# Patient Record
Sex: FEMALE | Race: WHITE | Employment: FULL TIME
[De-identification: names, ages, dates, MRNs, and addresses within clinical notes are randomized per-mention and may not be internally consistent; named-entity substitution may affect disease eponyms.]

---

## 2017-01-09 ENCOUNTER — OFFICE VISIT (OUTPATIENT)
Dept: UROLOGY | Facility: CLINIC | Age: 37
End: 2017-01-09

## 2017-01-09 VITALS
BODY MASS INDEX: 23.46 KG/M2 | HEIGHT: 66 IN | TEMPERATURE: 98.8 F | WEIGHT: 146 LBS | SYSTOLIC BLOOD PRESSURE: 120 MMHG | DIASTOLIC BLOOD PRESSURE: 76 MMHG

## 2017-01-09 DIAGNOSIS — R10.9 FLANK PAIN: ICD-10-CM

## 2017-01-09 DIAGNOSIS — N20.0 KIDNEY STONES: ICD-10-CM

## 2017-01-09 DIAGNOSIS — R10.9 LEFT SIDED ABDOMINAL PAIN: Primary | ICD-10-CM

## 2017-01-09 PROCEDURE — 99213 OFFICE O/P EST LOW 20 MIN: CPT | Performed by: PHYSICIAN ASSISTANT

## 2017-01-30 DIAGNOSIS — N93.8 DUB (DYSFUNCTIONAL UTERINE BLEEDING): ICD-10-CM

## 2017-01-30 RX ORDER — NORETHINDRONE ACETATE AND ETHINYL ESTRADIOL AND FERROUS FUMARATE 1MG-20(24)
1 KIT ORAL DAILY
Qty: 28 TABLET | Refills: 3 | Status: SHIPPED | OUTPATIENT
Start: 2017-01-30 | End: 2019-07-10 | Stop reason: SDUPTHER

## 2017-07-24 ENCOUNTER — HOSPITAL ENCOUNTER (OUTPATIENT)
Dept: SLEEP CENTER | Age: 37
Discharge: HOME | End: 2017-07-24
Payer: COMMERCIAL

## 2017-07-24 PROCEDURE — 95806 SLEEP STUDY UNATT&RESP EFFT: CPT

## 2017-10-10 ENCOUNTER — OFFICE VISIT (OUTPATIENT)
Dept: OBGYN | Age: 37
End: 2017-10-10
Payer: COMMERCIAL

## 2017-10-10 VITALS
SYSTOLIC BLOOD PRESSURE: 122 MMHG | HEIGHT: 65 IN | BODY MASS INDEX: 24.16 KG/M2 | WEIGHT: 145 LBS | DIASTOLIC BLOOD PRESSURE: 80 MMHG

## 2017-10-10 DIAGNOSIS — N93.8 DUB (DYSFUNCTIONAL UTERINE BLEEDING): Primary | ICD-10-CM

## 2017-10-10 DIAGNOSIS — R10.2 PELVIC PAIN IN FEMALE: ICD-10-CM

## 2017-10-10 PROCEDURE — 1036F TOBACCO NON-USER: CPT | Performed by: ADVANCED PRACTICE MIDWIFE

## 2017-10-10 PROCEDURE — G8420 CALC BMI NORM PARAMETERS: HCPCS | Performed by: ADVANCED PRACTICE MIDWIFE

## 2017-10-10 PROCEDURE — G8484 FLU IMMUNIZE NO ADMIN: HCPCS | Performed by: ADVANCED PRACTICE MIDWIFE

## 2017-10-10 PROCEDURE — G8427 DOCREV CUR MEDS BY ELIG CLIN: HCPCS | Performed by: ADVANCED PRACTICE MIDWIFE

## 2017-10-10 PROCEDURE — 99213 OFFICE O/P EST LOW 20 MIN: CPT | Performed by: ADVANCED PRACTICE MIDWIFE

## 2017-10-10 NOTE — PROGRESS NOTES
PROBLEM VISIT     Date of service: 10/10/2017    Christine Spivey  Is a 40 y.o. single female    PT's PCP is: Billy Atwood MD     : 1980                                             Subjective:       Patient's last menstrual period was 2017 (exact date). OB History    Para Term  AB Living   5 4 3 1 1 4   SAB TAB Ectopic Molar Multiple Live Births   1         4      # Outcome Date GA Lbr Emlchor/2nd Weight Sex Delivery Anes PTL Lv   5 Term 2009 38w0d 10:00 8 lb 3 oz (3.714 kg) M Vag-Spont  N RIO   4  2007 35w0d 02:00 6 lb 7 oz (2.92 kg)  Vag-Spont  Y RIO      Birth Comments:  labor at 31wks   3 Term 2002 37w0d 05:00 6 lb 7 oz (2.92 kg)  Vag-Spont  N RIO      Birth Comments: GDM   2 SAB 2001 13w0d          1 Term 1999 41w0d 07:00 7 lb 6 oz (3.345 kg)  Vag-Spont  N RIO      Birth Comments: pre-e           History   Smoking Status    Former Smoker    Packs/day: 0.25    Types: Cigarettes    Quit date: 2016   Smokeless Tobacco    Never Used        History   Alcohol Use    0.0 oz/week     Comment: rarely       History   Sexual Activity    Sexual activity: Yes    Partners: Male       Allergies: Morphine; Pcn [penicillins]; Codeine; and Nickel    Chief Complaint   Patient presents with    Irregular Menses     Pt having irregular bleeding- was put on ocp and it regulates for a few months and then its back to irregular- pt had medication adjusted to different hormones and then started having everyday morning sickness; Pt started her cycle on  and is still currently spotting/bleeding; pt does not want to go back on OCP becuase of feeling sick but wants to do something; Last Yearly:  16    Last pap: 16    Last HPV: 16    PE:  Vital Signs  Blood pressure 122/80, height 5' 5\" (1.651 m), weight 145 lb (65.8 kg), last menstrual period 2017, not currently breastfeeding.      Labs:    No results found for this visit on

## 2017-10-11 ASSESSMENT — ENCOUNTER SYMPTOMS
ABDOMINAL PAIN: 1
EYES NEGATIVE: 1
RESPIRATORY NEGATIVE: 1

## 2017-12-27 ENCOUNTER — TELEPHONE (OUTPATIENT)
Dept: OBGYN | Age: 37
End: 2017-12-27

## 2017-12-27 DIAGNOSIS — N93.8 DUB (DYSFUNCTIONAL UTERINE BLEEDING): Primary | ICD-10-CM

## 2017-12-28 ENCOUNTER — HOSPITAL ENCOUNTER (OUTPATIENT)
Age: 37
Discharge: HOME | End: 2017-12-28
Payer: COMMERCIAL

## 2017-12-28 DIAGNOSIS — N93.8 DUB (DYSFUNCTIONAL UTERINE BLEEDING): ICD-10-CM

## 2017-12-28 LAB
ESTRADIOL LEVEL: 36 PG/ML (ref 27–314)
LH: 22.4 U/L (ref 1–95.6)
PROGESTERONE LEVEL: <0.05 NG/ML
PROLACTIN: 13.06 UG/L (ref 4.79–23.3)
TSH SERPL DL<=0.05 MIU/L-ACNC: 0.97 MIU/L (ref 0.3–5)

## 2017-12-28 PROCEDURE — 82670 ASSAY OF TOTAL ESTRADIOL: CPT

## 2017-12-28 PROCEDURE — 84144 ASSAY OF PROGESTERONE: CPT

## 2017-12-28 PROCEDURE — 84146 ASSAY OF PROLACTIN: CPT

## 2017-12-28 PROCEDURE — 83001 ASSAY OF GONADOTROPIN (FSH): CPT

## 2017-12-28 PROCEDURE — 84443 ASSAY THYROID STIM HORMONE: CPT

## 2017-12-28 PROCEDURE — 36415 COLL VENOUS BLD VENIPUNCTURE: CPT

## 2017-12-28 PROCEDURE — 83002 ASSAY OF GONADOTROPIN (LH): CPT

## 2017-12-29 LAB — FOLLICLE STIMULATING HORMONE: 7.3 U/L (ref 1.7–21.5)

## 2018-01-10 ENCOUNTER — TELEPHONE (OUTPATIENT)
Dept: OBGYN | Age: 38
End: 2018-01-10

## 2018-01-10 DIAGNOSIS — N93.8 DUB (DYSFUNCTIONAL UTERINE BLEEDING): Primary | ICD-10-CM

## 2018-01-23 LAB
C-REACTIVE PROTEIN: 4.07 MG/L
INSULIN: 7.1 MIU/ML (ref 0–29.1)

## 2018-01-24 LAB
GLUCOSE 1 HOUR: 95 MG/DL
GLUCOSE 2 HOUR: 100 MG/DL
GLUCOSE 3: 70 MG/DL
GLUCOSE FASTING: 95 MG/DL (ref 70–110)

## 2018-09-17 ENCOUNTER — OFFICE VISIT (OUTPATIENT)
Dept: OBGYN | Age: 38
End: 2018-09-17
Payer: COMMERCIAL

## 2018-09-17 VITALS
HEIGHT: 65 IN | DIASTOLIC BLOOD PRESSURE: 66 MMHG | WEIGHT: 154.2 LBS | BODY MASS INDEX: 25.69 KG/M2 | SYSTOLIC BLOOD PRESSURE: 116 MMHG

## 2018-09-17 DIAGNOSIS — N93.8 DUB (DYSFUNCTIONAL UTERINE BLEEDING): Primary | ICD-10-CM

## 2018-09-17 DIAGNOSIS — Z09 FOLLOW-UP EXAMINATION AFTER GYNECOLOGICAL SURGERY: ICD-10-CM

## 2018-09-17 PROCEDURE — 1036F TOBACCO NON-USER: CPT | Performed by: ADVANCED PRACTICE MIDWIFE

## 2018-09-17 PROCEDURE — G8419 CALC BMI OUT NRM PARAM NOF/U: HCPCS | Performed by: ADVANCED PRACTICE MIDWIFE

## 2018-09-17 PROCEDURE — 99213 OFFICE O/P EST LOW 20 MIN: CPT | Performed by: ADVANCED PRACTICE MIDWIFE

## 2018-09-17 PROCEDURE — G8427 DOCREV CUR MEDS BY ELIG CLIN: HCPCS | Performed by: ADVANCED PRACTICE MIDWIFE

## 2018-09-17 ASSESSMENT — PATIENT HEALTH QUESTIONNAIRE - PHQ9
SUM OF ALL RESPONSES TO PHQ QUESTIONS 1-9: 0
1. LITTLE INTEREST OR PLEASURE IN DOING THINGS: 0
SUM OF ALL RESPONSES TO PHQ9 QUESTIONS 1 & 2: 0
2. FEELING DOWN, DEPRESSED OR HOPELESS: 0
SUM OF ALL RESPONSES TO PHQ QUESTIONS 1-9: 0

## 2018-09-17 NOTE — PROGRESS NOTES
her tubes are patent    Yes  PT denies fever, chills, nausea and vomiting                                 Assessment and Plan          Diagnosis Orders   1. DUB (dysfunctional uterine bleeding)  Follicle Stimulating Hormone    Luteinizing Hormone    Prolactin    TSH without Reflex    XR HYSTEROSALPINGOGRAPHY S&I    Estradiol    Progesterone   2. Follow-up examination after gynecological surgery  XR HYSTEROSALPINGOGRAPHY S&I             I am having Ms. Sands maintain her ALPRAZolam, norgestimate-ethinyl estradiol, meloxicam, ondansetron, and Norethin Ace-Eth Estrad-FE. Return for pt needs [facility] at [facility].    She was also counseled on her preventative health maintenance recommendations and follow-up. There are no Patient Instructions on file for this visit.     Arnoldo Greene,9/17/2018 7:27 PM

## 2018-09-25 ENCOUNTER — HOSPITAL ENCOUNTER (OUTPATIENT)
Dept: GENERAL RADIOLOGY | Age: 38
Discharge: HOME | End: 2018-09-27
Payer: COMMERCIAL

## 2018-09-25 DIAGNOSIS — Z09 FOLLOW-UP EXAMINATION AFTER GYNECOLOGICAL SURGERY: ICD-10-CM

## 2018-09-25 DIAGNOSIS — N93.8 DUB (DYSFUNCTIONAL UTERINE BLEEDING): ICD-10-CM

## 2018-09-25 PROCEDURE — 74740 X-RAY FEMALE GENITAL TRACT: CPT

## 2018-09-25 PROCEDURE — 6360000004 HC RX CONTRAST MEDICATION: Performed by: ADVANCED PRACTICE MIDWIFE

## 2018-09-25 RX ADMIN — IOHEXOL 20 ML: 240 INJECTION, SOLUTION INTRATHECAL; INTRAVASCULAR; INTRAVENOUS; ORAL at 09:55

## 2019-07-10 ENCOUNTER — OFFICE VISIT (OUTPATIENT)
Dept: OBGYN | Age: 39
End: 2019-07-10
Payer: COMMERCIAL

## 2019-07-10 ENCOUNTER — HOSPITAL ENCOUNTER (OUTPATIENT)
Age: 39
Setting detail: SPECIMEN
Discharge: HOME | End: 2019-07-10
Payer: COMMERCIAL

## 2019-07-10 VITALS
SYSTOLIC BLOOD PRESSURE: 118 MMHG | HEIGHT: 65 IN | WEIGHT: 141 LBS | DIASTOLIC BLOOD PRESSURE: 78 MMHG | BODY MASS INDEX: 23.49 KG/M2

## 2019-07-10 DIAGNOSIS — N93.8 DUB (DYSFUNCTIONAL UTERINE BLEEDING): ICD-10-CM

## 2019-07-10 DIAGNOSIS — N76.0 ACUTE VAGINITIS: ICD-10-CM

## 2019-07-10 DIAGNOSIS — N76.0 ACUTE VAGINITIS: Primary | ICD-10-CM

## 2019-07-10 LAB
DIRECT EXAM: ABNORMAL
Lab: ABNORMAL
SPECIMEN DESCRIPTION: ABNORMAL

## 2019-07-10 PROCEDURE — 1036F TOBACCO NON-USER: CPT | Performed by: ADVANCED PRACTICE MIDWIFE

## 2019-07-10 PROCEDURE — 87660 TRICHOMONAS VAGIN DIR PROBE: CPT

## 2019-07-10 PROCEDURE — 99213 OFFICE O/P EST LOW 20 MIN: CPT | Performed by: ADVANCED PRACTICE MIDWIFE

## 2019-07-10 PROCEDURE — 87510 GARDNER VAG DNA DIR PROBE: CPT

## 2019-07-10 PROCEDURE — 87480 CANDIDA DNA DIR PROBE: CPT

## 2019-07-10 PROCEDURE — G8420 CALC BMI NORM PARAMETERS: HCPCS | Performed by: ADVANCED PRACTICE MIDWIFE

## 2019-07-10 PROCEDURE — G8427 DOCREV CUR MEDS BY ELIG CLIN: HCPCS | Performed by: ADVANCED PRACTICE MIDWIFE

## 2019-07-10 RX ORDER — METRONIDAZOLE 500 MG/1
500 TABLET ORAL 2 TIMES DAILY
Qty: 14 TABLET | Refills: 0 | Status: SHIPPED | OUTPATIENT
Start: 2019-07-10 | End: 2019-07-17

## 2019-07-10 RX ORDER — NORETHINDRONE ACETATE AND ETHINYL ESTRADIOL AND FERROUS FUMARATE 1MG-20(24)
1 KIT ORAL DAILY
Qty: 28 TABLET | Refills: 11 | Status: SHIPPED | OUTPATIENT
Start: 2019-07-10 | End: 2021-09-27

## 2019-07-10 ASSESSMENT — PATIENT HEALTH QUESTIONNAIRE - PHQ9: DEPRESSION UNABLE TO ASSESS: PT REFUSES

## 2019-07-10 NOTE — PROGRESS NOTES
letty     PROBLEM VISIT     Date of service: 7/10/2019    Pascual Silvestre  Is a 46 y.o.  female    PT's PCP is: Chris Jones MD     : 1980                                             Subjective:       Patient's last menstrual period was 2019 (exact date). OB History    Para Term  AB Living   5 4 3 1 1 4   SAB TAB Ectopic Molar Multiple Live Births   1         4      # Outcome Date GA Lbr Melchor/2nd Weight Sex Delivery Anes PTL Lv   5 Term 2009 38w0d 10:00 8 lb 3 oz (3.714 kg) M Vag-Spont  N RIO   4  2007 35w0d 02:00 6 lb 7 oz (2.92 kg)  Vag-Spont  Y RIO      Birth Comments:  labor at 28wks   3 Term 2002 37w0d 05:00 6 lb 7 oz (2.92 kg)  Vag-Spont  N RIO      Birth Comments: GDM   2 SAB 2001 13w0d          1 Term 1999 41w0d 07:00 7 lb 6 oz (3.345 kg)  Vag-Spont  N RIO      Birth Comments: pre-e        Social History     Tobacco Use   Smoking Status Former Smoker    Packs/day: 0.25    Types: Cigarettes    Last attempt to quit: 2016    Years since quittin.6   Smokeless Tobacco Never Used        Social History     Substance and Sexual Activity   Alcohol Use Yes    Alcohol/week: 0.0 oz    Comment: rarely       Allergies: Morphine; Pcn [penicillins];  Codeine; and Nickel      Current Outpatient Medications:     Norethin Ace-Eth Estrad-FE 1-20 MG-MCG(24) TABS, Take 1 tablet by mouth daily, Disp: 28 tablet, Rfl: 3    meloxicam (MOBIC) 15 MG tablet, Take 1 tablet by mouth daily, Disp: 30 tablet, Rfl: 0    ondansetron (ZOFRAN) 4 MG tablet, Take 1 tablet by mouth every 8 hours as needed for Nausea or Vomiting, Disp: 30 tablet, Rfl: 0    norgestimate-ethinyl estradiol (ORTHO-CYCLEN, 28,) 0.25-35 MG-MCG per tablet, Take 1 tablet by mouth daily, Disp: 1 packet, Rfl: 12    ALPRAZolam (XANAX) 1 MG tablet, nightly as needed , Disp: , Rfl: 1    Social History     Substance and Sexual Activity   Sexual Activity Yes    Partners: Male    Comment: none no bleeding

## 2019-08-12 ENCOUNTER — OFFICE VISIT (OUTPATIENT)
Dept: OBGYN | Age: 39
End: 2019-08-12
Payer: COMMERCIAL

## 2019-08-12 ENCOUNTER — HOSPITAL ENCOUNTER (OUTPATIENT)
Age: 39
Setting detail: SPECIMEN
Discharge: HOME | End: 2019-08-12
Payer: COMMERCIAL

## 2019-08-12 VITALS
WEIGHT: 142 LBS | HEIGHT: 66 IN | BODY MASS INDEX: 22.82 KG/M2 | DIASTOLIC BLOOD PRESSURE: 62 MMHG | SYSTOLIC BLOOD PRESSURE: 110 MMHG

## 2019-08-12 DIAGNOSIS — Z11.51 SCREENING FOR HPV (HUMAN PAPILLOMAVIRUS): ICD-10-CM

## 2019-08-12 DIAGNOSIS — Z01.419 WELL WOMAN EXAM WITH ROUTINE GYNECOLOGICAL EXAM: ICD-10-CM

## 2019-08-12 DIAGNOSIS — Z01.419 WELL WOMAN EXAM WITH ROUTINE GYNECOLOGICAL EXAM: Primary | ICD-10-CM

## 2019-08-12 PROCEDURE — 87624 HPV HI-RISK TYP POOLED RSLT: CPT

## 2019-08-12 PROCEDURE — 99395 PREV VISIT EST AGE 18-39: CPT | Performed by: ADVANCED PRACTICE MIDWIFE

## 2019-08-12 PROCEDURE — G0145 SCR C/V CYTO,THINLAYER,RESCR: HCPCS

## 2019-08-12 ASSESSMENT — PATIENT HEALTH QUESTIONNAIRE - PHQ9: DEPRESSION UNABLE TO ASSESS: PT REFUSES

## 2019-08-14 LAB
HPV SAMPLE: NORMAL
HPV, GENOTYPE 16: NOT DETECTED
HPV, GENOTYPE 18: NOT DETECTED
HPV, HIGH RISK OTHER: NOT DETECTED
HPV, INTERPRETATION: NORMAL
SPECIMEN DESCRIPTION: NORMAL

## 2019-08-16 LAB — CYTOLOGY REPORT: NORMAL

## 2021-02-17 ENCOUNTER — OFFICE VISIT (OUTPATIENT)
Dept: OBGYN | Age: 41
End: 2021-02-17
Payer: COMMERCIAL

## 2021-02-17 ENCOUNTER — HOSPITAL ENCOUNTER (OUTPATIENT)
Age: 41
Setting detail: SPECIMEN
Discharge: HOME | End: 2021-02-17
Payer: COMMERCIAL

## 2021-02-17 VITALS
BODY MASS INDEX: 24.66 KG/M2 | SYSTOLIC BLOOD PRESSURE: 104 MMHG | WEIGHT: 148 LBS | HEIGHT: 65 IN | DIASTOLIC BLOOD PRESSURE: 60 MMHG

## 2021-02-17 DIAGNOSIS — Z01.419 WELL WOMAN EXAM WITH ROUTINE GYNECOLOGICAL EXAM: ICD-10-CM

## 2021-02-17 DIAGNOSIS — Z01.419 WELL WOMAN EXAM WITH ROUTINE GYNECOLOGICAL EXAM: Primary | ICD-10-CM

## 2021-02-17 DIAGNOSIS — Z12.31 ENCOUNTER FOR SCREENING MAMMOGRAM FOR MALIGNANT NEOPLASM OF BREAST: ICD-10-CM

## 2021-02-17 PROCEDURE — G0145 SCR C/V CYTO,THINLAYER,RESCR: HCPCS

## 2021-02-17 PROCEDURE — 99395 PREV VISIT EST AGE 18-39: CPT | Performed by: ADVANCED PRACTICE MIDWIFE

## 2021-02-17 ASSESSMENT — PATIENT HEALTH QUESTIONNAIRE - PHQ9
SUM OF ALL RESPONSES TO PHQ QUESTIONS 1-9: 0
1. LITTLE INTEREST OR PLEASURE IN DOING THINGS: 0
SUM OF ALL RESPONSES TO PHQ QUESTIONS 1-9: 0
2. FEELING DOWN, DEPRESSED OR HOPELESS: 0
SUM OF ALL RESPONSES TO PHQ9 QUESTIONS 1 & 2: 0

## 2021-02-17 NOTE — PROGRESS NOTES
(Patient not taking: Reported on 8/12/2019), Disp: 28 tablet, Rfl: 11    meloxicam (MOBIC) 15 MG tablet, Take 1 tablet by mouth daily (Patient not taking: Reported on 8/12/2019), Disp: 30 tablet, Rfl: 0    ondansetron (ZOFRAN) 4 MG tablet, Take 1 tablet by mouth every 8 hours as needed for Nausea or Vomiting (Patient not taking: Reported on 8/12/2019), Disp: 30 tablet, Rfl: 0    norgestimate-ethinyl estradiol (ORTHO-CYCLEN, 28,) 0.25-35 MG-MCG per tablet, Take 1 tablet by mouth daily (Patient not taking: Reported on 8/12/2019), Disp: 1 packet, Rfl: 12    ALPRAZolam (XANAX) 1 MG tablet, nightly as needed , Disp: , Rfl: 1    Social History     Substance and Sexual Activity   Sexual Activity Yes    Partners: Male    Comment: none        Any bleeding or pain with intercourse: No    Last Yearly:  2019    Last pap: 08/12/19    Last HPV: 08/12/19 neg     Last Mammogram: n/a    Last Dexascan n/a    Last colorectal screen- type:n/a*  date      Do you do self breast exams: Yes    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    High risk HPV infection     Kidney stone     LSIL (low grade squamous intraepithelial lesion) on Pap smear 05/09/2012    LSIL (low grade squamous intraepithelial lesion) on Pap smear 03/21/2007    PTSD (post-traumatic stress disorder)     Type O blood, Rh negative        Past Surgical History:   Procedure Laterality Date    APPENDECTOMY  2012    BARTHOLIN GLAND CYST EXCISION      [place]  2012    [place]    COLPOSCOPY  06/01/2012    d/t LSIL    COLPOSCOPY  08/30/2007    WNL    ENDOMETRIAL BIOPSY  3/15/13    WNL    LAPAROSCOPY  06/15/2012    dx lap WNL    LEEP      LIPOSUCTION      OTHER SURGICAL HISTORY  04/24/2009    ESSURE    TONSILLECTOMY AND ADENOIDECTOMY         Family History   Problem Relation Age of Onset    Hypertension Father     Other Father         diverticulitis    Other Brother         diverticulitis    Other Brother         ulcerative coloitis    Ovarian Cancer Maternal Grandmother     Diabetes Paternal Grandfather     Heart Disease Paternal Grandfather     Ovarian Cancer Maternal Aunt        Chief Complaint   Patient presents with    Gynecologic Exam     Yearly. last pap 08/12/19 HPV neg. pt states no issues or concerns. pt declines std testing           PE:  Vital Signs  Blood pressure 104/60, height 5' 5\" (1.651 m), weight 148 lb (67.1 kg), last menstrual period 02/07/2021, not currently breastfeeding. Estimated body mass index is 24.63 kg/m² as calculated from the following:    Height as of this encounter: 5' 5\" (1.651 m). Weight as of this encounter: 148 lb (67.1 kg). Labs:    No results found for this visit on 02/17/21. PHQ-9 Total Score: 0 (2/17/2021 11:51 AM)      NURSE: DEJON    HPI: Patient here today for routine well woman exam.  Patient denies needs or concerns at this point in time 2 weeks ago she had liposuction. Review of Systems   All other systems reviewed and are negative. Objective  Lymphatic:   no lymphadenopathy  Heent:   negative   Cor: regular rate and rhythm, no murmurs              Pul:clear to auscultation bilaterally- no wheezes, rales or rhonchi, normal air movement, no respiratory distress      GI: Exam deferred due to recent surgery and abdominal swelling with tenderness           Extremities: normal strength, tone, and muscle mass   Breasts: Breast: Bilateral breast implants noted no abnormalities   Pelvic Exam: GENITAL/URINARY:  External Genitalia:  General appearance; normal, Hair distribution; normal, Lesions absent  Urethra:   Fullness absent, Masses absent  Bladder:  Fullness absent, Masses absent, Tenderness absent, Cystocele absent  Vagina:  General appearance normal, Estrogen effect normal, Discharge absent, Lesions absent, Pelvic support normal  Cervix:  General appearance normal, Lesions absent, Tenderness absent, Enlargement absent, Nodularity absent, spotting with Pap  Uterus:  Size normal, Contour normal  Adenexa: Masses absent                                                          Assessment and Plan          Diagnosis Orders   1. Well woman exam with routine gynecological exam  PAP SMEAR   2. Encounter for screening mammogram for malignant neoplasm of breast  JEANINE MATTHEW DIGITAL SCREEN BILATERAL             I am having Dairamsey Bond maintain her ALPRAZolam, norgestimate-ethinyl estradiol, meloxicam, ondansetron, and Norethin Ace-Eth Estrad-FE. Return in about 1 year (around 2/17/2022) for yearly. She was also counseled on her preventative health maintenance recommendations and follow-up. There are no Patient Instructions on file for this visit.     Dk Greene,2/17/2021 12:29 PM

## 2021-03-01 LAB — CYTOLOGY REPORT: NORMAL

## 2021-09-27 ENCOUNTER — OFFICE VISIT (OUTPATIENT)
Dept: OBGYN | Age: 41
End: 2021-09-27
Payer: COMMERCIAL

## 2021-09-27 VITALS
SYSTOLIC BLOOD PRESSURE: 116 MMHG | HEIGHT: 65 IN | WEIGHT: 148 LBS | BODY MASS INDEX: 24.66 KG/M2 | DIASTOLIC BLOOD PRESSURE: 72 MMHG

## 2021-09-27 DIAGNOSIS — N92.0 MENORRHAGIA WITH REGULAR CYCLE: Primary | ICD-10-CM

## 2021-09-27 DIAGNOSIS — N90.89 VULVAR IRRITATION: ICD-10-CM

## 2021-09-27 PROCEDURE — 99213 OFFICE O/P EST LOW 20 MIN: CPT | Performed by: OBSTETRICS & GYNECOLOGY

## 2021-09-27 RX ORDER — TRANEXAMIC ACID 650 1/1
TABLET ORAL
Qty: 30 TABLET | Refills: 5 | Status: SHIPPED | OUTPATIENT
Start: 2021-09-27 | End: 2022-02-16

## 2021-09-27 RX ORDER — LIDOCAINE 50 MG/G
OINTMENT TOPICAL
Qty: 1 EACH | Refills: 1 | Status: SHIPPED | OUTPATIENT
Start: 2021-09-27 | End: 2022-02-16

## 2021-09-27 NOTE — PROGRESS NOTES
PROBLEM VISIT     Date of service: 2021    Fatimah Martinez  Is a 37 y.o. single female    PT's PCP is: Aravind Herron MD     : 1980                                             Subjective:       No LMP recorded. OB History    Para Term  AB Living   5 4 3 1 1 4   SAB TAB Ectopic Molar Multiple Live Births   1         4      # Outcome Date GA Lbr Melchor/2nd Weight Sex Delivery Anes PTL Lv   5 Term 2009 38w0d 10:00 8 lb 3 oz (3.714 kg) M Vag-Spont  N RIO   4  2007 35w0d 02:00 6 lb 7 oz (2.92 kg)  Vag-Spont  Y RIO      Birth Comments:  labor at 28wks   3 Term 2002 37w0d 05:00 6 lb 7 oz (2.92 kg)  Vag-Spont  N RIO      Birth Comments: GDM   2 SAB 2001 13w0d          1 Term 1999 41w0d 07:00 7 lb 6 oz (3.345 kg)  Vag-Spont  N RIO      Birth Comments: pre-e        Social History     Tobacco Use   Smoking Status Former Smoker    Packs/day: 0.25    Types: Cigarettes    Quit date: 2016    Years since quittin.8   Smokeless Tobacco Never Used        Social History     Substance and Sexual Activity   Alcohol Use Yes    Alcohol/week: 0.0 standard drinks    Comment: rarely       Social History     Substance and Sexual Activity   Sexual Activity Yes    Partners: Male    Birth control/protection: Condom    Comment: none        Allergies: Morphine, Pcn [penicillins], Codeine, and Nickel    Chief Complaint   Patient presents with    Consultation     Pt is here today to discuss surgical options. Pt states she is having more pain, long heavy cycles.  Vaginal Pain     Pt states she had intercourse saturday with a condom and had instant pain, burning and swelling. Pt states she believes it is just d/t a reaction of the condom. Last Yearly:  21    Last pap: 21    Last HPV: 19      NURSE: Orlando Barry    PE:  Vital Signs  Blood pressure 116/72, height 5' 5\" (1.651 m), weight 148 lb (67.1 kg), not currently breastfeeding.      Labs:    No results found for this visit on 09/27/21. HPI: The patient is here and she does have significant complaints of menorrhagia stating she bleeds very heavy for 5 to 7 days with every cycle. She does not wish to use any more hormonal therapy for this complaint and is investigating other treatments. Her other complaint is that she developed immediate vulvar and vaginal irritation and burning when having sexual relations with a condom. She did request lidocaine ointment as treatment for this and is using Benadryl at home. No PT denies fever, chills, nausea and vomiting       Objective   GI: Abdomen soft, non-tender. BS normal. No masses,  No organomegaly           Pelvic Exam: GENITAL/URINARY:  External Genitalia:  Abnormal findings: The patient has significant erythema and edema of the vulva bilaterally                                                       Assessment and Plan: I did give patient information regarding vaginal hysterectomy and endometrial ablation possibly with bilateral salpingectomy. In the meantime I will give her a trial of Lysteda as she does not wish to use any hormonal medications. I also believe that she has developed a latex sensitivity due to her reaction with condom usage          Diagnosis Orders   1. Menorrhagia with regular cycle  tranexamic acid (LYSTEDA) 650 MG TABS tablet   2. Vulvar irritation  lidocaine (XYLOCAINE) 5 % ointment             I am having Jass Camera start on lidocaine and tranexamic acid. I am also having her maintain her ALPRAZolam, norgestimate-ethinyl estradiol, meloxicam, ondansetron, and Norethin Ace-Eth Estrad-FE. No follow-ups on file. Patient Instructions     Patient Education        Endometrial Ablation: Before Your Procedure  What is endometrial ablation? Endometrial ablation is a type of procedure that's often used to treat heavy menstrual bleeding. It can also be used for other types of bleeding in the uterus.  It's not recommended if you plan to get pregnant. Ablation works by destroying the lining of your uterus. As it heals, the lining will scar. This scarring reduces or prevents bleeding. Your doctor may give you medicine to help you relax. You may also get medicine to help with pain. First, your doctor inserts a special tool into your vagina. This is called a speculum. It gently spreads apart the sides of your vagina. Next, the doctor may put a lighted tube through your cervix. This is called a hysteroscope or scope. It helps the doctor see inside your uterus. Then the doctor inserts a device to destroy the lining. This device may work in one of many ways. It may use a laser beam, heat, electricity, freezing, or microwaves. Ablation can be done in a doctor's office. Or it may be done in a hospital. It usually takes less than an hour. You can go home after the procedure. Follow-up care is a key part of your treatment and safety. Be sure to make and go to all appointments, and call your doctor if you are having problems. It's also a good idea to know your test results and keep a list of the medicines you take. How do you prepare for the procedure? Procedures can be stressful. This information will help you understand what you can expect. And it will help you safely prepare for your procedure. Preparing for the procedure    · Be sure you have someone to take you home. Anesthesia and pain medicine will make it unsafe for you to drive or get home on your own.     · Understand exactly what procedure is planned, along with the risks, benefits, and other options.     · If you take aspirin or some other blood thinner, ask your doctor if you should stop taking it before your procedure. Make sure that you understand exactly what your doctor wants you to do. These medicines increase the risk of bleeding.     · Tell your doctor ALL the medicines, vitamins, supplements, and herbal remedies you take.  Some may increase the risk of problems during your procedure. Your doctor will tell you if you should stop taking any of them before the procedure and how soon to do it.     · Make sure your doctor and the hospital have a copy of your advance directive. If you don't have one, you may want to prepare one. It lets others know your health care wishes. It's a good thing to have before any type of surgery or procedure. What happens on the day of the procedure? · Follow the instructions exactly about when to stop eating and drinking. If you don't, your procedure may be canceled. If your doctor told you to take your medicines on the day of the procedure, take them with only a sip of water.     · Take a bath or shower before you come in for your procedure. Do not apply lotions, perfumes, deodorants, or nail polish.     · Take off all jewelry and piercings. And take out contact lenses, if you wear them. At the doctor's office or hospital   · Bring a picture ID.     · You will be kept comfortable and safe by your anesthesia provider. You may get medicine that relaxes you or puts you in a light sleep.     · The procedure will take less than an hour. When should you call your doctor? · You have questions or concerns.     · You do not understand how to prepare for your procedure.     · You become ill before the procedure (such as fever, flu, or a cold).     · You need to reschedule or have changed your mind about having the procedure. Where can you learn more? Go to https://[employer]. org and sign in to your [employer] account. Enter J540 in the [facility] box to learn more about \"Endometrial Ablation: Before Your Procedure. \"     If you do not have an account, please click on the \"Sign Up Now\" link. Current as of: February 11, 2021               Content Version: 13.0  © 1951-0572 Healthwise, Incorporated. Care instructions adapted under license by [facility] ([facility]).  If you have questions about a medical condition or this instruction, always ask your healthcare professional. [address] any warranty or liability for your use of this information. Patient Education        Endometrial Ablation: What to Expect at Home  Your Recovery  Endometrial ablation is a procedure to treat very heavy menstrual bleeding or other abnormal bleeding in the uterus. During ablation, the lining of the uterus is destroyed. The lining heals by scarring. The scarring reduces or prevents bleeding. Your doctor inserted a device into your uterus to destroy the lining. You may have cramps and vaginal bleeding for several days. You may also have watery vaginal discharge mixed with blood for a few days. It may take a few days to 2 weeks to recover. This care sheet gives you a general idea about how long it will take for you to recover. But each person recovers at a different pace. Follow the steps below to feel better as quickly as possible. How can you care for yourself at home? Activity    · Rest when you feel tired. Getting enough sleep will help you recover.     · Most women are able to return to work on the day after the procedure.     · You may shower and take baths as usual.     · Ask your doctor when it is okay for you to have sex. Diet    · You can eat your normal diet. If your stomach is upset, try bland, low-fat foods like plain rice, broiled chicken, toast, and yogurt.     · You may notice that your bowel movements are not regular right after the procedure. This is common. Try to avoid constipation and straining with bowel movements. You may want to take a fiber supplement every day. If you have not had a bowel movement after a couple of days, ask your doctor about taking a mild laxative. Medicines    · Your doctor will tell you if and when you can restart your medicines.  He or she will also give you instructions about taking any new medicines.     · If you take aspirin or some other blood thinner, ask your doctor if and when to start taking it again. Make sure that you understand exactly what your doctor wants you to do.     · Take pain medicines exactly as directed. ? If the doctor gave you a prescription medicine for pain, take it as prescribed. ? If you are not taking a prescription pain medicine, ask your doctor if you can take an over-the-counter medicine.     · If you think your pain medicine is making you sick to your stomach:  ? Take your medicine after meals (unless your doctor has told you not to). ? Ask your doctor for a different pain medicine.     · If your doctor prescribed antibiotics, take them as directed. Do not stop taking them just because you feel better. You need to take the full course of antibiotics. Other instructions    · You may have some light vaginal bleeding. Wear sanitary pads if needed. Do not douche or use tampons until your doctor says it is okay.     · You may want to use a heating pad on your belly to help with pain. Use a low heat setting.     · Talk with your doctor about birth control. Endometrial ablation usually causes infertility, but pregnancy may still be possible. And the pregnancy could have severe problems. Follow-up care is a key part of your treatment and safety. Be sure to make and go to all appointments, and call your doctor if you are having problems. It's also a good idea to know your test results and keep a list of the medicines you take. When should you call for help? Call 911 anytime you think you may need emergency care. For example, call if:    · You passed out (lost consciousness).     · You have chest pain, are short of breath, or cough up blood.    Call your doctor now or seek immediate medical care if:    · You have pain that does not get better after you take pain medicine.     · You cannot pass stools or gas.     · You have vaginal discharge that has increased in amount or smells bad.     · You are sick to your stomach or cannot drink fluids.     · You have signs of or she will talk to you about the risks and benefits of hormones. You can also discuss how long you should take them. This surgery probably won't lower your interest in sex. In fact, some women enjoy sex more. This may be because they no longer have to worry about birth control or heavy bleeding. Most women go home in 1 to 2 days. You will likely feel better each day. But you may need about 4 to 6 weeks to fully recover. Follow-up care is a key part of your treatment and safety. Be sure to make and go to all appointments, and call your doctor if you are having problems. It's also a good idea to know your test results and keep a list of the medicines you take. How do you prepare for surgery? Surgery can be stressful. This information will help you understand what you can expect. And it will help you safely prepare for surgery. Preparing for surgery    · Be sure you have someone to take you home. Anesthesia and pain medicine will make it unsafe for you to drive or get home on your own.     · Understand exactly what surgery is planned, along with the risks, benefits, and other options.     · If you take aspirin or some other blood thinner, ask your doctor if you should stop taking it before your surgery. Make sure that you understand exactly what your doctor wants you to do. These medicines increase the risk of bleeding.     · Tell your doctor ALL the medicines, vitamins, supplements, and herbal remedies you take. Some may increase the risk of problems during your surgery. Your doctor will tell you if you should stop taking any of them before the surgery and how soon to do it.     · Make sure your doctor and the hospital have a copy of your advance directive. If you don't have one, you may want to prepare one. It lets others know your health care wishes. It's a good thing to have before any type of surgery or procedure. What happens on the day of surgery?    · Follow the instructions exactly about when to stop stronger each day. But you might need pain medicine for a week or two. You may get tired easily or have less energy than usual. This may last for several weeks after surgery. You will probably notice that your belly is swollen and puffy. This is common. The swelling will take several weeks to go down. It may take about 4 to 6 weeks to fully recover. It's important to avoid lifting while you are recovering so that you can heal.  This care sheet gives you a general idea about how long it will take for you to recover. But each person recovers at a different pace. Follow the steps below to get better as quickly as possible. How can you care for yourself at home? Activity    · Rest when you feel tired. Getting enough sleep will help you recover.     · Try to walk each day. Start by walking a little more than you did the day before. Bit by bit, increase the amount you walk. Walking boosts blood flow and helps prevent pneumonia and constipation.     · Avoid lifting anything that would make you strain. This may include a child, heavy grocery bags and milk containers, a heavy briefcase or backpack, cat litter or dog food bags, or a vacuum .     · Avoid strenuous activities, such as biking, jogging, weight lifting, or aerobic exercise, until your doctor says it is okay.     · You may shower. Pat the cut (incision) dry. Do not take a bath for the first 2 weeks, or until your doctor tells you it is okay.     · Ask your doctor when you can drive again.     · You will probably need to take 2 to 4 weeks off from work. It depends on the type of work you do and how you feel.     · Your doctor will tell you when you can have sex again. Diet    · You can eat your normal diet.  If your stomach is upset, try bland, low-fat foods like plain rice, broiled chicken, toast, and yogurt.     · Drink plenty of fluids (unless your doctor tells you not to).     · You may notice that your bowel movements are not regular right after your if you are having problems. It's also a good idea to know your test results and keep a list of the medicines you take. When should you call for help? Call 911 anytime you think you may need emergency care. For example, call if:    · You passed out (lost consciousness).     · You have chest pain, are short of breath, or cough up blood. Call your doctor now or seek immediate medical care if:    · You have pain that does not get better after you take pain medicine.     · You cannot pass stools or gas.     · You have vaginal discharge that has increased in amount or smells bad.     · You are sick to your stomach or cannot drink fluids.     · You have loose stitches, or your incision comes open.     · Bright red blood has soaked through the bandage over your incision.     · You have signs of infection, such as:  ? Increased pain, swelling, warmth, or redness. ? Red streaks leading from the incision. ? Pus draining from the incision. ? A fever.     · You have bright red vaginal bleeding that soaks one or more pads in an hour, or you have large clots.     · You have signs of a blood clot in your leg (called a deep vein thrombosis), such as:  ? Pain in your calf, back of the knee, thigh, or groin. ? Redness and swelling in your leg. Watch closely for changes in your health, and be sure to contact your doctor if you have any problems. Where can you learn more? Go to https://Stockleaplluviaeb.[employer]. org and sign in to your [employer] account. Enter M280 in the [facility] box to learn more about \"Abdominal Hysterectomy: What to Expect at Home. \"     If you do not have an account, please click on the \"Sign Up Now\" link. Current as of: February 11, 2021               Content Version: 13.0  © 4581-8929 Healthwise, Incorporated. Care instructions adapted under license by [facility] ([facility]).  If you have questions about a medical condition or this instruction, always ask your healthcare professional. [employer], [facility] disclaims any warranty or liability for your use of this information. Over 50% of time spent on counseling and care coordination on: see assessment and plan,  She was also counseled on her preventative health maintenance recommendations and follow-up.         FF time: 20 min      Jyoti Rick MD,9/27/2021 11:38 AM

## 2021-09-27 NOTE — PATIENT INSTRUCTIONS
Patient Education        Endometrial Ablation: Before Your Procedure  What is endometrial ablation? Endometrial ablation is a type of procedure that's often used to treat heavy menstrual bleeding. It can also be used for other types of bleeding in the uterus. It's not recommended if you plan to get pregnant. Ablation works by destroying the lining of your uterus. As it heals, the lining will scar. This scarring reduces or prevents bleeding. Your doctor may give you medicine to help you relax. You may also get medicine to help with pain. First, your doctor inserts a special tool into your vagina. This is called a speculum. It gently spreads apart the sides of your vagina. Next, the doctor may put a lighted tube through your cervix. This is called a hysteroscope or scope. It helps the doctor see inside your uterus. Then the doctor inserts a device to destroy the lining. This device may work in one of many ways. It may use a laser beam, heat, electricity, freezing, or microwaves. Ablation can be done in a doctor's office. Or it may be done in a hospital. It usually takes less than an hour. You can go home after the procedure. Follow-up care is a key part of your treatment and safety. Be sure to make and go to all appointments, and call your doctor if you are having problems. It's also a good idea to know your test results and keep a list of the medicines you take. How do you prepare for the procedure? Procedures can be stressful. This information will help you understand what you can expect. And it will help you safely prepare for your procedure. Preparing for the procedure    · Be sure you have someone to take you home.  Anesthesia and pain medicine will make it unsafe for you to drive or get home on your own.     · Understand exactly what procedure is planned, along with the risks, benefits, and other options.     · If you take aspirin or some other blood thinner, ask your doctor if you should stop taking it before your procedure. Make sure that you understand exactly what your doctor wants you to do. These medicines increase the risk of bleeding.     · Tell your doctor ALL the medicines, vitamins, supplements, and herbal remedies you take. Some may increase the risk of problems during your procedure. Your doctor will tell you if you should stop taking any of them before the procedure and how soon to do it.     · Make sure your doctor and the hospital have a copy of your advance directive. If you don't have one, you may want to prepare one. It lets others know your health care wishes. It's a good thing to have before any type of surgery or procedure. What happens on the day of the procedure? · Follow the instructions exactly about when to stop eating and drinking. If you don't, your procedure may be canceled. If your doctor told you to take your medicines on the day of the procedure, take them with only a sip of water.     · Take a bath or shower before you come in for your procedure. Do not apply lotions, perfumes, deodorants, or nail polish.     · Take off all jewelry and piercings. And take out contact lenses, if you wear them. At the doctor's office or hospital   · Bring a picture ID.     · You will be kept comfortable and safe by your anesthesia provider. You may get medicine that relaxes you or puts you in a light sleep.     · The procedure will take less than an hour. When should you call your doctor? · You have questions or concerns.     · You do not understand how to prepare for your procedure.     · You become ill before the procedure (such as fever, flu, or a cold).     · You need to reschedule or have changed your mind about having the procedure. Where can you learn more? Go to https://chip.[employer]. org and sign in to your OpenLabel account. Enter S081 in the [address] [employer] box to learn more about \"Endometrial Ablation: Before Your Procedure. \"     If you do not have an account, please click on the \"Sign Up Now\" link. Current as of: February 11, 2021               Content Version: 13.0  © 2006-2021 [employer]. Care instructions adapted under license by [facility] ([facility]). If you have questions about a medical condition or this instruction, always ask your healthcare professional. [address] any warranty or liability for your use of this information. Patient Education        Endometrial Ablation: What to Expect at Home  Your Recovery  Endometrial ablation is a procedure to treat very heavy menstrual bleeding or other abnormal bleeding in the uterus. During ablation, the lining of the uterus is destroyed. The lining heals by scarring. The scarring reduces or prevents bleeding. Your doctor inserted a device into your uterus to destroy the lining. You may have cramps and vaginal bleeding for several days. You may also have watery vaginal discharge mixed with blood for a few days. It may take a few days to 2 weeks to recover. This care sheet gives you a general idea about how long it will take for you to recover. But each person recovers at a different pace. Follow the steps below to feel better as quickly as possible. How can you care for yourself at home? Activity    · Rest when you feel tired. Getting enough sleep will help you recover.     · Most women are able to return to work on the day after the procedure.     · You may shower and take baths as usual.     · Ask your doctor when it is okay for you to have sex. Diet    · You can eat your normal diet. If your stomach is upset, try bland, low-fat foods like plain rice, broiled chicken, toast, and yogurt.     · You may notice that your bowel movements are not regular right after the procedure. This is common. Try to avoid constipation and straining with bowel movements. You may want to take a fiber supplement every day.  If you have not had a bowel movement after a couple of days, ask your doctor about taking a mild laxative. Medicines    · Your doctor will tell you if and when you can restart your medicines. He or she will also give you instructions about taking any new medicines.     · If you take aspirin or some other blood thinner, ask your doctor if and when to start taking it again. Make sure that you understand exactly what your doctor wants you to do.     · Take pain medicines exactly as directed. ? If the doctor gave you a prescription medicine for pain, take it as prescribed. ? If you are not taking a prescription pain medicine, ask your doctor if you can take an over-the-counter medicine.     · If you think your pain medicine is making you sick to your stomach:  ? Take your medicine after meals (unless your doctor has told you not to). ? Ask your doctor for a different pain medicine.     · If your doctor prescribed antibiotics, take them as directed. Do not stop taking them just because you feel better. You need to take the full course of antibiotics. Other instructions    · You may have some light vaginal bleeding. Wear sanitary pads if needed. Do not douche or use tampons until your doctor says it is okay.     · You may want to use a heating pad on your belly to help with pain. Use a low heat setting.     · Talk with your doctor about birth control. Endometrial ablation usually causes infertility, but pregnancy may still be possible. And the pregnancy could have severe problems. Follow-up care is a key part of your treatment and safety. Be sure to make and go to all appointments, and call your doctor if you are having problems. It's also a good idea to know your test results and keep a list of the medicines you take. When should you call for help? Call 911 anytime you think you may need emergency care. For example, call if:    · You passed out (lost consciousness).     · You have chest pain, are short of breath, or cough up blood.    Call your doctor now or seek immediate medical care if:    · You have pain that does not get better after you take pain medicine.     · You cannot pass stools or gas.     · You have vaginal discharge that has increased in amount or smells bad.     · You are sick to your stomach or cannot drink fluids.     · You have signs of infection, such as:  ? Increased pain, swelling, warmth, or redness. ? A fever.     · You have bright red vaginal bleeding that soaks one or more pads in an hour, or you have large clots.     · You have signs of a blood clot in your leg (called a deep vein thrombosis), such as:  ? Pain in your calf, back of the knee, thigh, or groin. ? Redness and swelling in your leg. Watch closely for any changes in your health, and be sure to contact your doctor if you have any problems. Where can you learn more? Go to https://[employer]b.Spry. org and sign in to your Blekko account. Enter H022 in the [employer] box to learn more about \"Endometrial Ablation: What to Expect at Home. \"     If you do not have an account, please click on the \"Sign Up Now\" link. Current as of: February 11, 2021               Content Version: 13.0  © 2006-2021 [employer]. Care instructions adapted under license by [facility] ([facility]). If you have questions about a medical condition or this instruction, always ask your healthcare professional. [place] [address] any warranty or liability for your use of this information. Patient Education        Abdominal Hysterectomy: Before Your Surgery  What is an abdominal hysterectomy? Abdominal hysterectomy is surgery to remove the uterus. The cervix is often taken out too. This is done through a cut in the lower belly. The cut is called an incision. The ovaries and fallopian tubes also may be removed. The doctor may make a horizontal incision. This cut goes from side-to-side and is done at the pubic hairline. It's called a \"bikini cut. \" Or the incision may be vertical. This type goes up and down between the belly button and the pubic bone. Both types leave a scar that most often fades with time. After the surgery, you will no longer have periods or be able to get pregnant. Your doctor may have you take hormones if your ovaries were removed. He or she will talk to you about the risks and benefits of hormones. You can also discuss how long you should take them. This surgery probably won't lower your interest in sex. In fact, some women enjoy sex more. This may be because they no longer have to worry about birth control or heavy bleeding. Most women go home in 1 to 2 days. You will likely feel better each day. But you may need about 4 to 6 weeks to fully recover. Follow-up care is a key part of your treatment and safety. Be sure to make and go to all appointments, and call your doctor if you are having problems. It's also a good idea to know your test results and keep a list of the medicines you take. How do you prepare for surgery? Surgery can be stressful. This information will help you understand what you can expect. And it will help you safely prepare for surgery. Preparing for surgery    · Be sure you have someone to take you home. Anesthesia and pain medicine will make it unsafe for you to drive or get home on your own.     · Understand exactly what surgery is planned, along with the risks, benefits, and other options.     · If you take aspirin or some other blood thinner, ask your doctor if you should stop taking it before your surgery. Make sure that you understand exactly what your doctor wants you to do. These medicines increase the risk of bleeding.     · Tell your doctor ALL the medicines, vitamins, supplements, and herbal remedies you take. Some may increase the risk of problems during your surgery.  Your doctor will tell you if you should stop taking any of them before the surgery and how soon to do it.     · Make sure your doctor and the hospital have a Diet    · You can eat your normal diet. If your stomach is upset, try bland, low-fat foods like plain rice, broiled chicken, toast, and yogurt.     · Drink plenty of fluids (unless your doctor tells you not to).     · You may notice that your bowel movements are not regular right after your surgery. This is common. Try to avoid constipation and straining with bowel movements. You may want to take a fiber supplement every day. If you have not had a bowel movement after a couple of days, ask your doctor about taking a mild laxative. Medicines    · Your doctor will tell you if and when you can restart your medicines. You will also get instructions about taking any new medicines.     · If you take aspirin or some other blood thinner, ask your doctor if and when to start taking it again. Make sure that you understand exactly what your doctor wants you to do.     · Be safe with medicines. Take pain medicines exactly as directed. ? If the doctor gave you a prescription medicine for pain, take it as prescribed. ? If you are not taking a prescription pain medicine, ask your doctor if you can take an over-the-counter medicine.     · If your doctor prescribed antibiotics, take them as directed. Do not stop taking them just because you feel better. You need to take the full course of antibiotics.     · If you think your pain medicine is making you sick to your stomach:  ? Take your medicine after meals (unless your doctor has told you not to). ? Ask your doctor for a different pain medicine. Incision care    · If you have strips of tape on the cut (incision) the doctor made, leave the tape on for a week or until it falls off. Or follow your doctor's instructions for removing the tape.     · Wash the area daily with warm, soapy water, and pat it dry. Don't use hydrogen peroxide or alcohol, which can slow healing. You may cover the area with a gauze bandage if it weeps or rubs against clothing.  Change the bandage every day.     · Keep the area clean and dry. Other instructions    · You may have some light vaginal bleeding. Wear sanitary pads if needed. Do not douche or use tampons. Follow-up care is a key part of your treatment and safety. Be sure to make and go to all appointments, and call your doctor if you are having problems. It's also a good idea to know your test results and keep a list of the medicines you take. When should you call for help? Call 911 anytime you think you may need emergency care. For example, call if:    · You passed out (lost consciousness).     · You have chest pain, are short of breath, or cough up blood. Call your doctor now or seek immediate medical care if:    · You have pain that does not get better after you take pain medicine.     · You cannot pass stools or gas.     · You have vaginal discharge that has increased in amount or smells bad.     · You are sick to your stomach or cannot drink fluids.     · You have loose stitches, or your incision comes open.     · Bright red blood has soaked through the bandage over your incision.     · You have signs of infection, such as:  ? Increased pain, swelling, warmth, or redness. ? Red streaks leading from the incision. ? Pus draining from the incision. ? A fever.     · You have bright red vaginal bleeding that soaks one or more pads in an hour, or you have large clots.     · You have signs of a blood clot in your leg (called a deep vein thrombosis), such as:  ? Pain in your calf, back of the knee, thigh, or groin. ? Redness and swelling in your leg. Watch closely for changes in your health, and be sure to contact your doctor if you have any problems. Where can you learn more? Go to https://[employer]viaeb.CITTIO. org and sign in to your Euroling account. Enter M280 in the [employer] box to learn more about \"Abdominal Hysterectomy: What to Expect at Home. \"     If you do not have an account, please click on the \"Sign Up Now\" link. Current as of: February 11, 2021               Content Version: 13.0  © 9558-8079 [employer]wise, Incorporated. Care instructions adapted under license by [facility] ([facility]). If you have questions about a medical condition or this instruction, always ask your healthcare professional. [address] any warranty or liability for your use of this information.

## 2021-10-26 ENCOUNTER — HOSPITAL ENCOUNTER (OUTPATIENT)
Age: 41
Discharge: HOME | End: 2021-10-28
Payer: COMMERCIAL

## 2021-10-26 ENCOUNTER — HOSPITAL ENCOUNTER (OUTPATIENT)
Dept: GENERAL RADIOLOGY | Age: 41
Discharge: HOME | End: 2021-10-28
Payer: COMMERCIAL

## 2021-10-26 DIAGNOSIS — M54.6 PAIN IN THORACIC SPINE: ICD-10-CM

## 2021-10-26 PROCEDURE — 72072 X-RAY EXAM THORAC SPINE 3VWS: CPT

## 2021-11-11 DIAGNOSIS — A60.9 HSV (HERPES SIMPLEX VIRUS) ANOGENITAL INFECTION: ICD-10-CM

## 2021-11-11 RX ORDER — VALACYCLOVIR HYDROCHLORIDE 500 MG/1
500 TABLET, FILM COATED ORAL 2 TIMES DAILY
Qty: 6 TABLET | Refills: 1 | Status: SHIPPED | OUTPATIENT
Start: 2021-11-11 | End: 2021-11-29

## 2021-11-29 DIAGNOSIS — A60.9 HSV (HERPES SIMPLEX VIRUS) ANOGENITAL INFECTION: Primary | ICD-10-CM

## 2021-11-29 RX ORDER — VALACYCLOVIR HYDROCHLORIDE 500 MG/1
500 TABLET, FILM COATED ORAL DAILY
Qty: 30 TABLET | Refills: 12 | Status: SHIPPED | OUTPATIENT
Start: 2021-11-29 | End: 2022-04-18 | Stop reason: SDUPTHER

## 2022-02-21 ENCOUNTER — OFFICE VISIT (OUTPATIENT)
Dept: UROLOGY | Age: 42
End: 2022-02-21
Payer: COMMERCIAL

## 2022-02-21 ENCOUNTER — HOSPITAL ENCOUNTER (OUTPATIENT)
Age: 42
Setting detail: SPECIMEN
Discharge: HOME | End: 2022-02-21
Payer: COMMERCIAL

## 2022-02-21 VITALS — WEIGHT: 153 LBS | SYSTOLIC BLOOD PRESSURE: 124 MMHG | BODY MASS INDEX: 25.46 KG/M2 | DIASTOLIC BLOOD PRESSURE: 84 MMHG

## 2022-02-21 DIAGNOSIS — R30.0 DYSURIA: ICD-10-CM

## 2022-02-21 DIAGNOSIS — N20.0 RENAL STONE: ICD-10-CM

## 2022-02-21 DIAGNOSIS — R30.0 DYSURIA: Primary | ICD-10-CM

## 2022-02-21 LAB
-: ABNORMAL
AMORPHOUS: ABNORMAL
BACTERIA: ABNORMAL
BILIRUBIN URINE: NEGATIVE
CASTS UA: ABNORMAL /LPF
COLOR: YELLOW
COMMENT UA: ABNORMAL
CRYSTALS, UA: ABNORMAL /HPF
DIRECT EXAM: NORMAL
EPITHELIAL CELLS UA: ABNORMAL /HPF (ref 0–25)
GLUCOSE URINE: NEGATIVE
KETONES, URINE: NEGATIVE
LEUKOCYTE ESTERASE, URINE: NEGATIVE
Lab: NORMAL
MUCUS: ABNORMAL
NITRITE, URINE: NEGATIVE
OTHER OBSERVATIONS UA: ABNORMAL
PH UA: 6 (ref 5–9)
PROTEIN UA: ABNORMAL
RBC UA: ABNORMAL /HPF (ref 0–2)
RENAL EPITHELIAL, UA: ABNORMAL /HPF
SPECIFIC GRAVITY UA: >1.03 (ref 1.01–1.02)
SPECIMEN DESCRIPTION: NORMAL
TRICHOMONAS: ABNORMAL
TURBIDITY: CLEAR
URINE HGB: ABNORMAL
UROBILINOGEN, URINE: NORMAL
WBC UA: ABNORMAL /HPF (ref 0–5)
YEAST: ABNORMAL

## 2022-02-21 PROCEDURE — 87210 SMEAR WET MOUNT SALINE/INK: CPT

## 2022-02-21 PROCEDURE — 87086 URINE CULTURE/COLONY COUNT: CPT

## 2022-02-21 PROCEDURE — 99203 OFFICE O/P NEW LOW 30 MIN: CPT | Performed by: NURSE PRACTITIONER

## 2022-02-21 PROCEDURE — 81001 URINALYSIS AUTO W/SCOPE: CPT

## 2022-02-21 RX ORDER — TRANEXAMIC ACID 650 1/1
1300 TABLET ORAL EVERY 6 HOURS PRN
COMMUNITY
End: 2022-04-18 | Stop reason: SDUPTHER

## 2022-02-21 ASSESSMENT — ENCOUNTER SYMPTOMS
NAUSEA: 0
SHORTNESS OF BREATH: 0
APNEA: 0
BACK PAIN: 0
CONSTIPATION: 0
VOMITING: 0
COLOR CHANGE: 0
EYE REDNESS: 0
ABDOMINAL PAIN: 0
COUGH: 0
WHEEZING: 0

## 2022-02-21 NOTE — PROGRESS NOTES
HPI:        Patient is a 37 y.o. female in no acute distress. She is alert and oriented to person, place, and time. History  2013 spontaneous stone passage    2016 CT showed bilateral punctate stones    Today  Here today as a new patient for frequent urinary tract infections. We have not seen patient since 2016. She states that she had a urine dipstick that was positive where she works and she was treated with a course of Bactrim. After antibiotics were completed the dipstick was still positive. A urine culture was never done. She is complaining of pain after urination. It has been intermittent for the last 6 months. Her pain is in the urethra and the left flank. She denies any gross hematuria, frequency, urgency, nocturia or incontinence. She does have regular periods. She states she has daily bowel movements. She denies consumption of known bladder irritants.     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    High risk HPV infection     Kidney stone     LSIL (low grade squamous intraepithelial lesion) on Pap smear 05/09/2012    LSIL (low grade squamous intraepithelial lesion) on Pap smear 03/21/2007    PTSD (post-traumatic stress disorder)     Type O blood, Rh negative      Past Surgical History:   Procedure Laterality Date    APPENDECTOMY  2012    BARTHOLIN GLAND CYST EXCISION      [place]OSCOPY  2012    [place]    COLPOSCOPY  06/01/2012    d/t LSIL    COLPOSCOPY  08/30/2007    WNL    ENDOMETRIAL BIOPSY  3/15/13    WNL    LAPAROSCOPY  06/15/2012    dx lap WNL    LEEP      LIPOSUCTION      OTHER SURGICAL HISTORY  04/24/2009    ESSURE    TONSILLECTOMY AND ADENOIDECTOMY       Outpatient Encounter Medications as of 2/21/2022   Medication Sig Dispense Refill    tranexamic acid (LYSTEDA) 650 MG TABS tablet Take 1,300 mg by mouth every 6 hours as needed      valACYclovir (VALTREX) 500 MG tablet Take 1 tablet by mouth daily (Patient not taking: Reported on 2022) 30 tablet 12     No facility-administered encounter medications on file as of 2022. Current Outpatient Medications on File Prior to Visit   Medication Sig Dispense Refill    tranexamic acid (LYSTEDA) 650 MG TABS tablet Take 1,300 mg by mouth every 6 hours as needed      valACYclovir (VALTREX) 500 MG tablet Take 1 tablet by mouth daily (Patient not taking: Reported on 2022) 30 tablet 12     No current facility-administered medications on file prior to visit. Morphine, Pcn [penicillins], Codeine, and Nickel  Family History   Problem Relation Age of Onset    Hypertension Father     Other Father         diverticulitis    Other Brother         diverticulitis    Other Brother         ulcerative coloitis    Ovarian Cancer Maternal Grandmother     Diabetes Paternal Grandfather     Heart Disease Paternal Grandfather     Ovarian Cancer Maternal Aunt      Social History     Tobacco Use   Smoking Status Former Smoker    Packs/day: 0.25    Types: Cigarettes    Quit date: 2016    Years since quittin.2   Smokeless Tobacco Never Used       Social History     Substance and Sexual Activity   Alcohol Use Yes    Alcohol/week: 0.0 standard drinks    Comment: rarely       Review of Systems   Constitutional: Negative for appetite change, chills and fever. Eyes: Negative for redness and visual disturbance. Respiratory: Negative for apnea, cough, shortness of breath and wheezing. Cardiovascular: Negative for chest pain and leg swelling. Gastrointestinal: Negative for abdominal pain, constipation, nausea and vomiting. Genitourinary: Positive for dysuria and flank pain. Negative for difficulty urinating, dyspareunia, enuresis, frequency, hematuria, pelvic pain, urgency, vaginal bleeding and vaginal discharge. Musculoskeletal: Negative for back pain, joint swelling and myalgias. Skin: Negative for color change, rash and wound.    Neurological: Negative for dizziness, tremors and numbness. Hematological: Negative for adenopathy. Does not bruise/bleed easily. Psychiatric/Behavioral: Negative for sleep disturbance. /84 (Site: Left Upper Arm, Position: Sitting, Cuff Size: Medium Adult)   Wt 153 lb (69.4 kg)   BMI 25.46 kg/m²       PHYSICAL EXAM:  Constitutional: Patient resting comfortably, in no acute distress. Neuro: Alert and oriented to person place and time. Cranial nerves grossly intact. Psych: Mood and affect normal.  Skin: Warm, dry  HEENT: normocephalic, atraumatic  Lymphatics: No palpable lymphadenopathy  Lungs: Respiratory effort normal, unlabored  Cardiovascular:  Normal peripheral pulses  Abdomen: Soft, non-tender, non-distended with no organomegaly or palpable masses. : No CVA tenderness. Bladder non-tender and not distended. Pelvic: Deferred    Lab Results   Component Value Date    BUN 13 07/02/2016     Lab Results   Component Value Date    CREATININE 0.73 07/02/2016       ASSESSMENT:   Diagnosis Orders   1. Dysuria  Urinalysis with Microscopic    Culture, Urine    Wet prep, genital   2. Renal stone             PLAN:  Vaginal culture due to history of Gardnerella     We will check a UA C&S. If urine culture is positive we will treat her with at least 10 days of antibiotics.     If urine culture is negative we will check a KUB and ultrasound    If urine culture is positive she will complete antibiotics and follow-up in the office in 4 weeks with a KUB prior

## 2022-02-22 ENCOUNTER — TELEPHONE (OUTPATIENT)
Dept: UROLOGY | Age: 42
End: 2022-02-22

## 2022-02-22 LAB
CULTURE: NORMAL
SPECIMEN DESCRIPTION: NORMAL

## 2022-02-22 NOTE — RESULT ENCOUNTER NOTE
Please let her know that her urine culture was negative for urinary tract infection. Her wet prep was negative for vaginal infection. She did have significant microscopic blood in her urine. Please schedule her for a virtual visit to discuss the work-up of this.

## 2022-02-23 ENCOUNTER — TELEPHONE (OUTPATIENT)
Dept: UROLOGY | Age: 42
End: 2022-02-23

## 2022-02-23 ENCOUNTER — HOSPITAL ENCOUNTER (OUTPATIENT)
Dept: CT IMAGING | Age: 42
Discharge: HOME | End: 2022-02-25
Payer: COMMERCIAL

## 2022-02-23 ENCOUNTER — OFFICE VISIT (OUTPATIENT)
Dept: UROLOGY | Age: 42
End: 2022-02-23
Payer: COMMERCIAL

## 2022-02-23 VITALS — BODY MASS INDEX: 25.49 KG/M2 | HEIGHT: 65 IN | WEIGHT: 153 LBS

## 2022-02-23 DIAGNOSIS — N23 RENAL COLIC: ICD-10-CM

## 2022-02-23 DIAGNOSIS — N23 RENAL COLIC: Primary | ICD-10-CM

## 2022-02-23 DIAGNOSIS — R31.29 MICROSCOPIC HEMATURIA: ICD-10-CM

## 2022-02-23 PROCEDURE — 74176 CT ABD & PELVIS W/O CONTRAST: CPT

## 2022-02-23 PROCEDURE — 99214 OFFICE O/P EST MOD 30 MIN: CPT | Performed by: PHYSICIAN ASSISTANT

## 2022-02-23 RX ORDER — SULFAMETHOXAZOLE AND TRIMETHOPRIM 800; 160 MG/1; MG/1
TABLET ORAL
COMMUNITY
End: 2022-02-28 | Stop reason: ALTCHOICE

## 2022-02-23 RX ORDER — TRIAMCINOLONE ACETONIDE 1 MG/G
CREAM TOPICAL
COMMUNITY
End: 2022-07-20 | Stop reason: CLARIF

## 2022-02-23 ASSESSMENT — ENCOUNTER SYMPTOMS
VOMITING: 0
ABDOMINAL PAIN: 0
EYE REDNESS: 0
COLOR CHANGE: 0
WHEEZING: 0
APNEA: 0
COUGH: 0
SHORTNESS OF BREATH: 0
CONSTIPATION: 0
NAUSEA: 0
BACK PAIN: 0

## 2022-02-23 NOTE — TELEPHONE ENCOUNTER
Patient notified of response and is headed to [facility] for CT scan. Centralized scheduling called and patient placed on radiology schedule.

## 2022-02-23 NOTE — TELEPHONE ENCOUNTER
Patient called and denies having any other symptoms other than pain that is rating 5-6. Took ibuprofen about an hour ago with little effect. Patient was advised to call the office in the future with any concerns to ensure a quicker response from staff. Patient voiced understanding.

## 2022-02-23 NOTE — PROGRESS NOTES
HPI:    Patient is a 37 y.o. female in no acute distress. She is alert and oriented to person, place, and time. History  2013 spontaneous stone passage    2016 CT showed bilateral punctate stones    2/23/2021 New patient for intermittent pain on urination. We have not seen patient since 2016. She states that she had a urine dipstick that was positive where she works and she was treated with a course of Bactrim. After antibiotics were completed the dipstick was still positive. A urine culture was never done. She is complaining of pain after urination. Patient with left flank pain as well. She denies any gross hematuria, frequency, urgency, nocturia or incontinence. She does have regular periods. She states she has daily bowel movements. She denies consumption of known bladder irritants. Today:  Patient is here today secondary to moderate left flank pain. Patient was seen by our office on 2/21/2022. At that time she did have a urine culture and a wet prep. Both urine culture and wet prep were negative. Patient did have 2-5 RBCs per high-powered field on microscopic urinalysis. Patient does state that she is currently having her period. Patient did contact our office earlier today with this worsening pain. She denies any fever, chills, nausea, vomiting. We did send her for a stat CT stone protocol. She denies any gross hematuria. Patient CT scan was independently reviewed showing no hydronephrosis or hydroureter. There are no ureteral or bladder stones. There is no bladder wall thickening. Patient does have two 1 mm right renal calculi. There is a 3 mm left renal calculi. On radiology read the uterus and adnexa appear normal with no free fluid.        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    High risk HPV infection     Kidney stone     LSIL (low grade squamous intraepithelial lesion) on Pap smear 05/09/2012    LSIL (low grade squamous intraepithelial lesion) on Pap smear 03/21/2007    PTSD (post-traumatic stress disorder)     Type O blood, Rh negative      Past Surgical History:   Procedure Laterality Date    APPENDECTOMY  2012    BARTHOLIN GLAND CYST EXCISION      [place]  2012    [place]    COLPOSCOPY  06/01/2012    d/t LSIL    COLPOSCOPY  08/30/2007    WNL    ENDOMETRIAL BIOPSY  3/15/13    WNL    LAPAROSCOPY  06/15/2012    dx lap WNL    LEEP      LIPOSUCTION      OTHER SURGICAL HISTORY  04/24/2009    ESSURE    TONSILLECTOMY AND ADENOIDECTOMY       Outpatient Encounter Medications as of 2/23/2022   Medication Sig Dispense Refill    triamcinolone (KENALOG) 0.1 % cream apply to itchy skin      sulfamethoxazole-trimethoprim (BACTRIM DS) 800-160 MG per tablet 1 tablet      tranexamic acid (LYSTEDA) 650 MG TABS tablet Take 1,300 mg by mouth every 6 hours as needed      valACYclovir (VALTREX) 500 MG tablet Take 1 tablet by mouth daily (Patient not taking: Reported on 2/21/2022) 30 tablet 12     No facility-administered encounter medications on file as of 2/23/2022. Current Outpatient Medications on File Prior to Visit   Medication Sig Dispense Refill    triamcinolone (KENALOG) 0.1 % cream apply to itchy skin      sulfamethoxazole-trimethoprim (BACTRIM DS) 800-160 MG per tablet 1 tablet      tranexamic acid (LYSTEDA) 650 MG TABS tablet Take 1,300 mg by mouth every 6 hours as needed      valACYclovir (VALTREX) 500 MG tablet Take 1 tablet by mouth daily (Patient not taking: Reported on 2/21/2022) 30 tablet 12     No current facility-administered medications on file prior to visit.      Cephalexin, Morphine, Pcn [penicillins], Codeine, and Nickel  Family History   Problem Relation Age of Onset    Hypertension Father     Other Father         diverticulitis    Other Brother         diverticulitis    Other Brother         ulcerative coloitis    Ovarian Cancer Maternal Grandmother     Diabetes Paternal work-up. We discussed we need to rule out bladder and kidney cancer. At this point we will not repeat imaging as she just had a CT abdomen pelvis without contrast today. We do need to directly visualize lower urinary tract with cystoscopy. We discussed risk and benefits of cystoscopy including bleeding infection. We will schedule her. Encouraged water intake    Follow-up for cystoscopy, we will discuss stone prevention at that time as well.

## 2022-03-21 ENCOUNTER — PROCEDURE VISIT (OUTPATIENT)
Dept: UROLOGY | Age: 42
End: 2022-03-21
Payer: COMMERCIAL

## 2022-03-21 VITALS
HEIGHT: 66 IN | HEART RATE: 96 BPM | TEMPERATURE: 98.2 F | SYSTOLIC BLOOD PRESSURE: 116 MMHG | WEIGHT: 153 LBS | BODY MASS INDEX: 24.59 KG/M2 | DIASTOLIC BLOOD PRESSURE: 81 MMHG

## 2022-03-21 DIAGNOSIS — N20.0 RENAL STONE: ICD-10-CM

## 2022-03-21 DIAGNOSIS — R31.29 MICROSCOPIC HEMATURIA: Primary | ICD-10-CM

## 2022-03-21 PROCEDURE — 99213 OFFICE O/P EST LOW 20 MIN: CPT | Performed by: UROLOGY

## 2022-03-21 PROCEDURE — 52000 CYSTOURETHROSCOPY: CPT | Performed by: UROLOGY

## 2022-03-21 ASSESSMENT — ENCOUNTER SYMPTOMS
COUGH: 0
BACK PAIN: 0
SHORTNESS OF BREATH: 0
VOMITING: 0
WHEEZING: 0
EYE REDNESS: 0
NAUSEA: 0
ABDOMINAL PAIN: 0
EYE PAIN: 0
COLOR CHANGE: 0

## 2022-03-21 NOTE — PROGRESS NOTES
HPI:        Patient is a 37 y.o. female in no acute distress. She is alert and oriented to person, place, and time. History  2013 spontaneous stone passage     2016 CT showed bilateral punctate stones     2/23/2021 New patient for intermittent pain on urination. We have not seen patient since 2016. She states that she had a urine dipstick that was positive where she works and she was treated with a course of Bactrim. After antibiotics were completed the dipstick was still positive. A urine culture was never done. She is complaining of pain after urination. Patient with left flank pain as well. She denies any gross hematuria, frequency, urgency, nocturia or incontinence. She does have regular periods. She states she has daily bowel movements. She denies consumption of known bladder irritants. Currently  Patient is here today for lower tract visualization. This is secondary to microscopic hematuria work-up. Patient is doing well. No recent gross hematuria dysuria. She has no flank pain nausea vomiting. She has no other new or worsening lower urinary tract symptoms. She not had any recent urinary tract infections. She reports no dyspareunia. She does have a daily bowel movement. No pain today. Cystoscopy Procedure Note    Pre-operative Diagnosis: microhematuria    Post-operative Diagnosis: Same     Surgeon: Sandra Barker    Assistants: None    Anesthesia : Local    Procedure Details   The risks, benefits, complications, treatment options, and expected outcomes were discussed with the patient. The patient concurred with the proposed plan, giving informed consent. Cystoscopy was performed today under local anesthesia, using sterile technique. The patient was placed in the dorsal lithotomy position, prepped with CHG, and draped in the usual sterile fashion.  A 14 [place] flexible cystoscope was used to systematically inspect both the urethra and bladder in their entirety. Findings:  Anterior urethra: normal without strictures  Hyperplasia: na  Bladder: Normal mucosa, without lesions. Ureteral orifice(s) was/were seen in the normal position and effluxing clear urine  Trabeculations No  Diverticulum No  Description:          Specimens: Cytology/urine culture No                 Complications:  None; patient tolerated the procedure well. 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    High risk HPV infection     Kidney stone     LSIL (low grade squamous intraepithelial lesion) on Pap smear 05/09/2012    LSIL (low grade squamous intraepithelial lesion) on Pap smear 03/21/2007    PTSD (post-traumatic stress disorder)     Type O blood, Rh negative      Past Surgical History:   Procedure Laterality Date    APPENDECTOMY  2012    BARTHOLIN GLAND CYST EXCISION      [place]  2012    [place]    COLPOSCOPY  06/01/2012    d/t LSIL    COLPOSCOPY  08/30/2007    WNL    ENDOMETRIAL BIOPSY  3/15/13    WNL    LAPAROSCOPY  06/15/2012    dx lap WNL    LEEP      LIPOSUCTION      OTHER SURGICAL HISTORY  04/24/2009    ESSURE    TONSILLECTOMY AND ADENOIDECTOMY       Outpatient Encounter Medications as of 3/21/2022   Medication Sig Dispense Refill    BIOTIN PO Take by mouth      triamcinolone (KENALOG) 0.1 % cream apply to itchy skin (Patient not taking: Reported on 2/28/2022)      tranexamic acid (LYSTEDA) 650 MG TABS tablet Take 1,300 mg by mouth every 6 hours as needed       valACYclovir (VALTREX) 500 MG tablet Take 1 tablet by mouth daily (Patient not taking: Reported on 2/21/2022) 30 tablet 12     No facility-administered encounter medications on file as of 3/21/2022.       Current Outpatient Medications on File Prior to Visit   Medication Sig Dispense Refill    BIOTIN PO Take by mouth      triamcinolone (KENALOG) 0.1 % cream apply to itchy skin (Patient not taking: Reported on 2022)      tranexamic acid (LYSTEDA) 650 MG TABS tablet Take 1,300 mg by mouth every 6 hours as needed       valACYclovir (VALTREX) 500 MG tablet Take 1 tablet by mouth daily (Patient not taking: Reported on 2022) 30 tablet 12     No current facility-administered medications on file prior to visit. Cephalexin, Morphine, Pcn [penicillins], Codeine, and Nickel  Family History   Problem Relation Age of Onset    Hypertension Father     Other Father         diverticulitis    Other Brother         diverticulitis    Other Brother         ulcerative coloitis    Ovarian Cancer Maternal Grandmother     Diabetes Paternal Grandfather     Heart Disease Paternal Grandfather     Ovarian Cancer Maternal Aunt      Social History     Tobacco Use   Smoking Status Former Smoker    Packs/day: 0.25    Types: Cigarettes    Quit date: 2016    Years since quittin.3   Smokeless Tobacco Never Used       Social History     Substance and Sexual Activity   Alcohol Use Yes    Alcohol/week: 0.0 standard drinks    Comment: rarely       Review of Systems   Constitutional: Negative for appetite change, chills and fever. Eyes: Negative for pain, redness and visual disturbance. Respiratory: Negative for cough, shortness of breath and wheezing. Cardiovascular: Negative for chest pain and leg swelling. Gastrointestinal: Negative for abdominal pain, nausea and vomiting. Genitourinary: Negative for difficulty urinating, dysuria, flank pain, frequency, hematuria, pelvic pain, vaginal bleeding and vaginal discharge. Musculoskeletal: Negative for back pain, joint swelling and myalgias. Skin: Negative for color change, rash and wound. Neurological: Negative for dizziness, tremors and numbness. Hematological: Negative for adenopathy. Does not bruise/bleed easily. There were no vitals taken for this visit.       PHYSICAL EXAM:  Constitutional: Patient resting comfortably, in no acute distress. Neuro: Alert and oriented to person place and time. Cranial nerves grossly intact. Psych: Mood and affect normal.  Skin: Warm, dry  HEENT: normocephalic, atraumatic  Lymphatics: No palpable lymphadenopathy  Lungs: Respiratory effort normal, unlabored  Cardiovascular:  Normal peripheral pulses  Abdomen: Soft, non-tender, non-distended with no organomegaly or palpable masses. : No CVA tenderness. Bladder non-tender and not distended. Pelvic: Normal anatomy    Lab Results   Component Value Date    BUN 16 03/09/2022     Lab Results   Component Value Date    CREATININE 0.71 03/09/2022       ASSESSMENT:  This is a 37 y.o. female with the following diagnoses:   Diagnosis Orders   1. Microscopic hematuria     2. Renal stone           PLAN:  Patient is clear from microscopic hematuria standpoint. We will see her back in 1 year. She will also get a repeat KUB. If any of her urinary symptoms do worsen before then she will come back sooner.

## 2022-03-21 NOTE — PATIENT INSTRUCTIONS
SURVEY:    You may be receiving a survey from [employer] regarding your visit today. Please complete the survey to enable us to provide the highest quality of care to you and your family. If you cannot score us a very good on any question, please call the office to discuss how we could have made your experience a very good one. Thank you.

## 2022-03-21 NOTE — PROGRESS NOTES
During cystoscopy the following was utilized on patient with no adverse affects:    45% SODIUM CHLORIDE 500 ML BAG  Lot number: [id number]  Expiration date: 11/22      LIDOCAINE HYDROCHLORIDE JELLY 2%   Lot number: [id number]  Expiration date: 05/23

## 2022-04-11 ENCOUNTER — HOSPITAL ENCOUNTER (OUTPATIENT)
Dept: GENERAL RADIOLOGY | Age: 42
Discharge: HOME | End: 2022-04-13
Payer: COMMERCIAL

## 2022-04-11 ENCOUNTER — HOSPITAL ENCOUNTER (OUTPATIENT)
Age: 42
Discharge: HOME | End: 2022-04-13
Payer: COMMERCIAL

## 2022-04-11 DIAGNOSIS — R10.32 LEFT LOWER QUADRANT ABDOMINAL PAIN: ICD-10-CM

## 2022-04-11 PROCEDURE — 74018 RADEX ABDOMEN 1 VIEW: CPT

## 2022-04-18 ENCOUNTER — HOSPITAL ENCOUNTER (OUTPATIENT)
Age: 42
Setting detail: SPECIMEN
Discharge: HOME | End: 2022-04-18
Payer: COMMERCIAL

## 2022-04-18 ENCOUNTER — OFFICE VISIT (OUTPATIENT)
Dept: OBGYN | Age: 42
End: 2022-04-18
Payer: COMMERCIAL

## 2022-04-18 VITALS
HEIGHT: 66 IN | DIASTOLIC BLOOD PRESSURE: 76 MMHG | WEIGHT: 160 LBS | BODY MASS INDEX: 25.71 KG/M2 | SYSTOLIC BLOOD PRESSURE: 118 MMHG

## 2022-04-18 DIAGNOSIS — A60.9 HSV (HERPES SIMPLEX VIRUS) ANOGENITAL INFECTION: ICD-10-CM

## 2022-04-18 DIAGNOSIS — N92.0 MENORRHAGIA WITH REGULAR CYCLE: ICD-10-CM

## 2022-04-18 DIAGNOSIS — B37.31 VAGINAL YEAST INFECTION: ICD-10-CM

## 2022-04-18 DIAGNOSIS — B37.31 VAGINAL YEAST INFECTION: Primary | ICD-10-CM

## 2022-04-18 PROCEDURE — 87070 CULTURE OTHR SPECIMN AEROBIC: CPT

## 2022-04-18 PROCEDURE — 99213 OFFICE O/P EST LOW 20 MIN: CPT | Performed by: OBSTETRICS & GYNECOLOGY

## 2022-04-18 PROCEDURE — 87491 CHLMYD TRACH DNA AMP PROBE: CPT

## 2022-04-18 PROCEDURE — 87591 N.GONORRHOEAE DNA AMP PROB: CPT

## 2022-04-18 RX ORDER — TRANEXAMIC ACID 650 1/1
TABLET ORAL
Qty: 30 TABLET | Refills: 5 | Status: SHIPPED | OUTPATIENT
Start: 2022-04-18 | End: 2022-07-20 | Stop reason: CLARIF

## 2022-04-18 RX ORDER — FLUCONAZOLE 150 MG/1
150 TABLET ORAL ONCE
Qty: 2 TABLET | Refills: 1 | Status: SHIPPED | OUTPATIENT
Start: 2022-04-18 | End: 2022-04-18

## 2022-04-18 RX ORDER — VALACYCLOVIR HYDROCHLORIDE 500 MG/1
500 TABLET, FILM COATED ORAL DAILY
Qty: 30 TABLET | Refills: 12 | Status: SHIPPED | OUTPATIENT
Start: 2022-04-18

## 2022-04-18 NOTE — PROGRESS NOTES
PROBLEM VISIT     Date of service: 2022    Angie Randall  Is a 37 y.o. single female    PT's PCP is: Daryle Reno, APRN - [facility]     : 1980                                             Subjective:       Patient's last menstrual period was 2022.    OB History    Para Term  AB Living   5 4 3 1 1 4   SAB IAB Ectopic Molar Multiple Live Births   1         4      # Outcome Date GA Lbr Melchor/2nd Weight Sex Delivery Anes PTL Lv   5 Term 2009 38w0d 10:00 8 lb 3 oz (3.714 kg) M Vag-Spont  N RIO   4  2007 35w0d 02:00 6 lb 7 oz (2.92 kg)  Vag-Spont  Y RIO      Birth Comments:  labor at 28wks   3 Term 2002 37w0d 05:00 6 lb 7 oz (2.92 kg)  Vag-Spont  N RIO      Birth Comments: GDM   2 SAB 2001 13w0d          1 Term 1999 41w0d 07:00 7 lb 6 oz (3.345 kg)  Vag-Spont  N RIO      Birth Comments: pre-e        Social History     Tobacco Use   Smoking Status Former Smoker    Packs/day: 0.25    Types: Cigarettes    Quit date: 2016    Years since quittin.3   Smokeless Tobacco Never Used        Social History     Substance and Sexual Activity   Alcohol Use Yes    Alcohol/week: 0.0 standard drinks    Comment: rarely       Allergies: Cephalexin, Morphine, Pcn [penicillins], Codeine, and Nickel      Current Outpatient Medications:     ciprofloxacin (CIPRO) 500 MG tablet, Take 1 tablet by mouth 2 times daily for 10 days, Disp: 20 tablet, Rfl: 0    metroNIDAZOLE (FLAGYL) 500 MG tablet, Take 1 tablet by mouth 3 times daily for 10 days, Disp: 30 tablet, Rfl: 0    ondansetron (ZOFRAN) 4 MG tablet, Take 1 tablet by mouth 3 times daily as needed for Nausea or Vomiting, Disp: 30 tablet, Rfl: 0    BIOTIN PO, Take by mouth (Patient not taking: Reported on 3/21/2022), Disp: , Rfl:     triamcinolone (KENALOG) 0.1 % cream, apply to itchy skin (Patient not taking: Reported on 2022), Disp: , Rfl:     tranexamic acid (LYSTEDA) 650 MG TABS tablet, Take 1,300 mg by mouth every 6 hours as needed  (Patient not taking: Reported on 3/21/2022), Disp: , Rfl:     valACYclovir (VALTREX) 500 MG tablet, Take 1 tablet by mouth daily (Patient not taking: Reported on 2/21/2022), Disp: 30 tablet, Rfl: 12    Social History     Substance and Sexual Activity   Sexual Activity Yes    Partners: Male    Birth control/protection: Condom    Comment: none        Last Yearly:  2/17/21    Last pap: 2/17/21    Last HPV: 8/2019    Chief Complaint   Patient presents with    Exposure to STD     patient states she has had a new partner and just wants to have a screening done. No symptoms, just some changes in her cycles. NURSE: NICOLE    PE:  Vital Signs  Blood pressure 118/76, height 5' 6\" (1.676 m), weight 160 lb (72.6 kg), last menstrual period 03/27/2022, not currently breastfeeding. Labs:    No results found for this visit on 04/18/22. NURSE: Manus Najjar    HPI: The patient is here today for an STD screening as she has a new partner. He is complaining of recurrent frequent HSV    No  PT denies fever, chills, nausea and vomiting       Objective: The vulva has a normal appearance. There is thick discharge in the posterior vagina that is consistent with yeast.  The cervix otherwise has a normal appearance with no evidence of cervicitis. Uterus is anteverted anteflexed normal size shape and contour the adnexa are nonenlarged                           Assessment and Plan: Will treat empirically for yeast while other cultures are pending          Diagnosis Orders   1. Vaginal yeast infection  fluconazole (DIFLUCAN) 150 MG tablet             No follow-ups on file. FF: 20 minutes    There are no Patient Instructions on file for this visit. Over 50%of time spent on counseling and care coordination on: see assessment and plan,  She was also counseled on her preventative health maintenance recommendations and follow-up.       Indio Baez MD,4/18/2022 12:01 PM

## 2022-04-19 LAB
C TRACH DNA GENITAL QL NAA+PROBE: NEGATIVE
N. GONORRHOEAE DNA: NEGATIVE
SPECIMEN DESCRIPTION: NORMAL

## 2022-04-21 LAB
CULTURE: NORMAL
SPECIMEN DESCRIPTION: NORMAL

## 2022-05-09 PROBLEM — H61.21 RIGHT EAR IMPACTED CERUMEN: Status: ACTIVE | Noted: 2022-05-09

## 2022-06-01 ENCOUNTER — HOSPITAL ENCOUNTER (OUTPATIENT)
Age: 42
Discharge: HOME | End: 2022-06-01
Payer: COMMERCIAL

## 2022-06-01 DIAGNOSIS — N91.2 AMENORRHEA: Primary | ICD-10-CM

## 2022-06-01 DIAGNOSIS — N91.2 AMENORRHEA: ICD-10-CM

## 2022-06-01 DIAGNOSIS — Z98.890 HISTORY OF REVERSAL OF TUBAL LIGATION: ICD-10-CM

## 2022-06-01 DIAGNOSIS — Z34.90 EARLY STAGE OF PREGNANCY: Primary | ICD-10-CM

## 2022-06-01 LAB — HCG QUANTITATIVE: 15 MIU/ML

## 2022-06-01 PROCEDURE — 36415 COLL VENOUS BLD VENIPUNCTURE: CPT

## 2022-06-01 PROCEDURE — 84702 CHORIONIC GONADOTROPIN TEST: CPT

## 2022-06-01 NOTE — PROGRESS NOTES
Patient called with results on quant new orders placed type and screen placed patient did have a Esher removal with tubal reversal the past.    We did review signs and symptoms of ectopic pregnancy and what to return to the emergency room with we will currently call her on Monday with results

## 2022-06-03 ENCOUNTER — HOSPITAL ENCOUNTER (OUTPATIENT)
Age: 42
Discharge: HOME | End: 2022-06-03
Payer: COMMERCIAL

## 2022-06-03 DIAGNOSIS — Z98.890 HISTORY OF REVERSAL OF TUBAL LIGATION: ICD-10-CM

## 2022-06-03 DIAGNOSIS — Z34.90 EARLY STAGE OF PREGNANCY: ICD-10-CM

## 2022-06-03 LAB
ABO/RH: NORMAL
ANTIBODY SCREEN: NEGATIVE
HCG QUANTITATIVE: 10 MIU/ML

## 2022-06-03 PROCEDURE — 84702 CHORIONIC GONADOTROPIN TEST: CPT

## 2022-06-03 PROCEDURE — 86900 BLOOD TYPING SEROLOGIC ABO: CPT

## 2022-06-03 PROCEDURE — 36415 COLL VENOUS BLD VENIPUNCTURE: CPT

## 2022-06-03 PROCEDURE — 86901 BLOOD TYPING SEROLOGIC RH(D): CPT

## 2022-06-03 PROCEDURE — 86850 RBC ANTIBODY SCREEN: CPT

## 2022-06-15 ENCOUNTER — TELEPHONE (OUTPATIENT)
Dept: UROLOGY | Age: 42
End: 2022-06-15

## 2022-06-15 DIAGNOSIS — R30.0 DYSURIA: Primary | ICD-10-CM

## 2022-06-15 NOTE — TELEPHONE ENCOUNTER
Drop off a UACS.  We will call with results    Increase water intake    Take OTC AZO as directed if needed

## 2022-07-07 ENCOUNTER — HOSPITAL ENCOUNTER (OUTPATIENT)
Age: 42
Discharge: HOME | End: 2022-07-07
Payer: COMMERCIAL

## 2022-07-07 ENCOUNTER — TELEPHONE (OUTPATIENT)
Dept: OBGYN | Age: 42
End: 2022-07-07

## 2022-07-07 DIAGNOSIS — Z34.90 EARLY STAGE OF PREGNANCY: Primary | ICD-10-CM

## 2022-07-07 DIAGNOSIS — Z34.90 EARLY STAGE OF PREGNANCY: ICD-10-CM

## 2022-07-07 LAB — HCG QUANTITATIVE: 380 MIU/ML

## 2022-07-07 PROCEDURE — 36415 COLL VENOUS BLD VENIPUNCTURE: CPT

## 2022-07-07 PROCEDURE — 84702 CHORIONIC GONADOTROPIN TEST: CPT

## 2022-07-08 DIAGNOSIS — O02.81 ELEVATED LEVEL OF QUANTITATIVE HCG FOR GESTATIONAL AGE IN EARLY PREGNANCY: Primary | ICD-10-CM

## 2022-07-08 PROCEDURE — 36415 COLL VENOUS BLD VENIPUNCTURE: CPT | Performed by: ADVANCED PRACTICE MIDWIFE

## 2022-07-09 ENCOUNTER — HOSPITAL ENCOUNTER (OUTPATIENT)
Age: 42
Discharge: HOME | End: 2022-07-09
Payer: COMMERCIAL

## 2022-07-09 DIAGNOSIS — O02.81 ELEVATED LEVEL OF QUANTITATIVE HCG FOR GESTATIONAL AGE IN EARLY PREGNANCY: ICD-10-CM

## 2022-07-09 LAB — HCG QUANTITATIVE: 841 MIU/ML

## 2022-07-09 PROCEDURE — 36415 COLL VENOUS BLD VENIPUNCTURE: CPT

## 2022-07-09 PROCEDURE — 84702 CHORIONIC GONADOTROPIN TEST: CPT

## 2022-07-11 ENCOUNTER — HOSPITAL ENCOUNTER (OUTPATIENT)
Age: 42
Discharge: HOME | End: 2022-07-11
Payer: COMMERCIAL

## 2022-07-11 DIAGNOSIS — O02.81 ELEVATED LEVEL OF QUANTITATIVE HCG FOR GESTATIONAL AGE IN EARLY PREGNANCY: ICD-10-CM

## 2022-07-11 LAB — HCG QUANTITATIVE: 1880 MIU/ML

## 2022-07-11 PROCEDURE — 84702 CHORIONIC GONADOTROPIN TEST: CPT

## 2022-07-11 PROCEDURE — 36415 COLL VENOUS BLD VENIPUNCTURE: CPT

## 2022-07-20 ENCOUNTER — HOSPITAL ENCOUNTER (OUTPATIENT)
Age: 42
Setting detail: SPECIMEN
Discharge: HOME | End: 2022-07-20
Payer: COMMERCIAL

## 2022-07-20 ENCOUNTER — ANCILLARY PROCEDURE (OUTPATIENT)
Dept: OBGYN | Age: 42
End: 2022-07-20
Payer: COMMERCIAL

## 2022-07-20 ENCOUNTER — INITIAL PRENATAL (OUTPATIENT)
Dept: OBGYN | Age: 42
End: 2022-07-20
Payer: COMMERCIAL

## 2022-07-20 VITALS
SYSTOLIC BLOOD PRESSURE: 114 MMHG | WEIGHT: 144.6 LBS | DIASTOLIC BLOOD PRESSURE: 72 MMHG | BODY MASS INDEX: 23.24 KG/M2 | HEIGHT: 66 IN

## 2022-07-20 DIAGNOSIS — N91.2 AMENORRHEA: ICD-10-CM

## 2022-07-20 DIAGNOSIS — Z34.81 ENCOUNTER FOR SUPERVISION OF OTHER NORMAL PREGNANCY IN FIRST TRIMESTER: ICD-10-CM

## 2022-07-20 DIAGNOSIS — Z32.01 POSITIVE PREGNANCY TEST: ICD-10-CM

## 2022-07-20 DIAGNOSIS — O36.80X0 ENCOUNTER TO DETERMINE FETAL VIABILITY OF PREGNANCY, SINGLE OR UNSPECIFIED FETUS: ICD-10-CM

## 2022-07-20 DIAGNOSIS — Z34.81 ENCOUNTER FOR SUPERVISION OF OTHER NORMAL PREGNANCY IN FIRST TRIMESTER: Primary | ICD-10-CM

## 2022-07-20 LAB
ABO/RH: NORMAL
ABSOLUTE EOS #: 0.12 K/UL (ref 0–0.44)
ABSOLUTE IMMATURE GRANULOCYTE: 0.05 K/UL (ref 0–0.3)
ABSOLUTE LYMPH #: 1.99 K/UL (ref 1.1–3.7)
ABSOLUTE MONO #: 0.58 K/UL (ref 0.1–1.2)
AMPHETAMINE SCREEN URINE: NEGATIVE
ANTIBODY SCREEN: NEGATIVE
BARBITURATE SCREEN URINE: NEGATIVE
BASOPHILS # BLD: 0 % (ref 0–2)
BASOPHILS ABSOLUTE: 0.03 K/UL (ref 0–0.2)
BENZODIAZEPINE SCREEN, URINE: NEGATIVE
BUPRENORPHINE URINE: NEGATIVE
CANNABINOID SCREEN URINE: NEGATIVE
COCAINE METABOLITE, URINE: NEGATIVE
EOSINOPHILS RELATIVE PERCENT: 1 % (ref 1–4)
HCT VFR BLD CALC: 39.6 % (ref 36.3–47.1)
HEMOGLOBIN: 12.8 G/DL (ref 11.9–15.1)
HEPATITIS B SURFACE ANTIGEN: NONREACTIVE
HEPATITIS C ANTIBODY: NONREACTIVE
IMMATURE GRANULOCYTES: 1 %
LYMPHOCYTES # BLD: 20 % (ref 24–43)
MCH RBC QN AUTO: 30 PG (ref 25.2–33.5)
MCHC RBC AUTO-ENTMCNC: 32.3 G/DL (ref 28.4–34.8)
MCV RBC AUTO: 93 FL (ref 82.6–102.9)
METHADONE SCREEN, URINE: NEGATIVE
METHAMPHETAMINE, URINE: NEGATIVE
MONOCYTES # BLD: 6 % (ref 3–12)
NRBC AUTOMATED: 0 PER 100 WBC
OPIATES, URINE: NEGATIVE
OXYCODONE SCREEN URINE: NEGATIVE
PDW BLD-RTO: 13.3 % (ref 11.8–14.4)
PHENCYCLIDINE, URINE: NEGATIVE
PLATELET # BLD: 353 K/UL (ref 138–453)
PMV BLD AUTO: 10 FL (ref 8.1–13.5)
PROPOXYPHENE, URINE: NEGATIVE
RBC # BLD: 4.26 M/UL (ref 3.95–5.11)
RUBV IGG SER QL: 53.1 IU/ML
SEG NEUTROPHILS: 72 % (ref 36–65)
SEGMENTED NEUTROPHILS ABSOLUTE COUNT: 7.3 K/UL (ref 1.5–8.1)
T. PALLIDUM, IGG: NONREACTIVE
TRICYCLIC ANTIDEPRESSANTS, UR: NEGATIVE
WBC # BLD: 10.1 K/UL (ref 3.5–11.3)

## 2022-07-20 PROCEDURE — 86900 BLOOD TYPING SEROLOGIC ABO: CPT

## 2022-07-20 PROCEDURE — 86850 RBC ANTIBODY SCREEN: CPT

## 2022-07-20 PROCEDURE — 86901 BLOOD TYPING SEROLOGIC RH(D): CPT

## 2022-07-20 PROCEDURE — 87086 URINE CULTURE/COLONY COUNT: CPT

## 2022-07-20 PROCEDURE — 36415 COLL VENOUS BLD VENIPUNCTURE: CPT | Performed by: ADVANCED PRACTICE MIDWIFE

## 2022-07-20 PROCEDURE — 80306 DRUG TEST PRSMV INSTRMNT: CPT

## 2022-07-20 PROCEDURE — 36415 COLL VENOUS BLD VENIPUNCTURE: CPT

## 2022-07-20 PROCEDURE — 86762 RUBELLA ANTIBODY: CPT

## 2022-07-20 PROCEDURE — 87491 CHLMYD TRACH DNA AMP PROBE: CPT

## 2022-07-20 PROCEDURE — 76817 TRANSVAGINAL US OBSTETRIC: CPT | Performed by: OBSTETRICS & GYNECOLOGY

## 2022-07-20 PROCEDURE — 86803 HEPATITIS C AB TEST: CPT

## 2022-07-20 PROCEDURE — 87591 N.GONORRHOEAE DNA AMP PROB: CPT

## 2022-07-20 PROCEDURE — 86780 TREPONEMA PALLIDUM: CPT

## 2022-07-20 PROCEDURE — 85025 COMPLETE CBC W/AUTO DIFF WBC: CPT

## 2022-07-20 PROCEDURE — 87389 HIV-1 AG W/HIV-1&-2 AB AG IA: CPT

## 2022-07-20 PROCEDURE — 87340 HEPATITIS B SURFACE AG IA: CPT

## 2022-07-20 RX ORDER — PROGESTERONE 100 MG/1
150 CAPSULE ORAL NIGHTLY
COMMUNITY

## 2022-07-20 RX ORDER — ASPIRIN 81 MG/1
81 TABLET ORAL DAILY
Qty: 90 TABLET | Refills: 1 | Status: CANCELLED | OUTPATIENT
Start: 2022-07-20

## 2022-07-20 NOTE — PATIENT INSTRUCTIONS
Patient Education        Learning About Pregnancy  Your Care Instructions     Your health in the early weeks of your pregnancy is particularly important for your baby's health. Take good care of yourself. Anything you do that harms yourbody can also harm your baby. Make sure to go to all of your doctor appointments. Regular checkups will helpkeep you and your baby healthy. How can you care for yourself at home? Diet    Eat a balanced diet. Make sure your diet includes plenty of beans, peas, and leafy green vegetables. Do not skip meals or go for many hours without eating. If you are nauseated, try to eat a small, healthy snack every 2 to 3 hours. Do not eat fish that has a high level of mercury, such as shark, swordfish, or mackerel. Do not eat more than one can of tuna each week. Drink plenty of fluids. If you have kidney, heart, or liver disease and have to limit fluids, talk with your doctor before you increase the amount of fluids you drink. Cut down on caffeine, such as coffee, tea, and cola. Do not drink alcohol, such as beer, wine, or hard liquor. Take a multivitamin that contains at least 400 micrograms (mcg) of folic acid to help prevent birth defects. Fortified cereal and whole wheat bread are good additional sources of folic acid. Increase the calcium in your diet. Try to drink a quart of skim milk each day. You may also take calcium supplements and choose foods such as cheese and yogurt. Lifestyle    Make sure you go to your follow-up appointments. Get plenty of rest. You may be unusually tired while you are pregnant. Get at least 30 minutes of exercise on most days of the week. Walking is a good choice. If you have not exercised in the past, start out slowly. Take several short walks each day. Do not smoke. If you need help quitting, talk to your doctor about stop-smoking programs. These can increase your chances of quitting for good.      Do not touch cat feces or litter boxes. Also, wash your hands after you handle raw meat, and fully cook all meat before you eat it. Wear gloves when you work in the yard or garden, and wash your hands well when you are done. Cat feces, raw or undercooked meat, and contaminated dirt can cause an infection that may harm your baby or lead to a miscarriage. Avoid things that can make your body too hot and may be harmful to your baby, such as a hot tub or sauna. Or talk with your doctor before doing anything that raises your body temperature. Your doctor can tell you if it's safe. Avoid chemical fumes, paint fumes, or poisons. Do not use illegal drugs, marijuana, or alcohol. Medicines    Review all of your medicines with your doctor. Some of your routine medicines may need to be changed to protect your baby. Use acetaminophen (Tylenol) to relieve minor problems, such as a mild headache or backache or a mild fever with cold symptoms. Do not use nonsteroidal anti-inflammatory drugs (NSAIDs), such as ibuprofen (Advil, Motrin) or naproxen (Aleve), unless your doctor says it is okay. Do not take two or more pain medicines at the same time unless the doctor told you to. Many pain medicines have acetaminophen, which is Tylenol. Too much acetaminophen (Tylenol) can be harmful. Take your medicines exactly as prescribed. Call your doctor if you think you are having a problem with your medicine. To manage morning sickness    If you feel sick when you first wake up, try eating a small snack (such as crackers) before you get out of bed. Allow some time to digest the snack, and then get out of bed slowly. Do not skip meals or go for long periods without eating. An empty stomach can make nausea worse. Eat small, frequent meals instead of three large meals each day. Drink plenty of fluids. Eat foods that are high in protein but low in fat.      If you are taking iron supplements, ask your doctor if they are nausea may be worse if your stomach is empty. Eat five or six small meals a day instead of three large meals. For morning nausea, eat a small snack, such as a couple of crackers or dry biscuits, before rising. Allow a few minutes for your stomach to settle before you get out of bed slowly. Drink plenty of fluids. If you have kidney, heart, or liver disease and have to limit fluids, talk with your doctor before you increase the amount of fluids you drink. Some women find that peppermint tea helps with nausea. Eat more protein, such as chicken, fish, lean meat, beans, nuts, and seeds. Eat carbohydrate foods, such as potatoes, whole-grain cereals, rice, and pasta. Avoid smells and foods that make you feel nauseated. Spicy or high-fat foods, citrus juice, milk, coffee, and tea with caffeine often make nausea worse. Do not drink alcohol. Do not smoke. Try not to be around others who smoke. If you need help quitting, talk to your doctor about stop-smoking programs and medicines. These can increase your chances of quitting for good. If you are taking iron supplements, ask your doctor if they are necessary. Iron can make nausea worse. Get lots of rest. Stress and fatigue can make your morning sickness worse. Ask your doctor about taking prescription medicine, or over-the-counter products such as vitamin B6, doxylamine, or angie, to relieve your symptoms. Your doctor can tell you the doses that are safe for you. Take your prenatal vitamins at night on a full stomach. When should you call for help? Call 911 anytime you think you may need emergency care. For example, call if:    You passed out (lost consciousness). Call your doctor now or seek immediate medical care if:    You are sick to your stomach or cannot drink fluids. You have symptoms of dehydration, such as:  Dry eyes and a dry mouth. Passing only a little urine. Feeling thirstier than usual.     You are not able to keep down your medicine. You have pain in your belly or pelvis. Watch closely for changes in your health, and be sure to contact your doctor if:    You do not get better as expected. Where can you learn more? Go to https://chtim.Fliplife. org and sign in to your [employer] account. Enter S891 in the [facility] box to learn more about \"Managing Morning Sickness: Care Instructions. \"     If you do not have an account, please click on the \"Sign Up Now\" link. Current as of: February 23, 2022               Content Version: 13.3  © 3402-7271 [employer]. Care instructions adapted under license by [facility] ([facility]). If you have questions about a medical condition or this instruction, always ask your healthcare professional. [address] any warranty or liability for your use of this information. Patient Education        Nutrition During Pregnancy: Care Instructions  Overview     Healthy eating when you are pregnant is important for you and your baby. It can help you feel well and have a successful pregnancy and delivery. During pregnancy your nutrition needs increase. Even if you have excellent eating habits, your doctor may recommend a multivitamin to make sure you get enoughiron and folic acid. You may wonder how much weight you should gain. In general, if you were at a healthy weight before you became pregnant, then you should gain between 25 and 35 pounds. If you were overweight before pregnancy, then you'll likely be advised to gain 15 to 25 pounds. If you were underweight before pregnancy, then you'll probably be advised to gain 28 to 40 pounds. Your doctor will work with you to set a weight goal that is right for you. Gaining a healthy amount ofweight helps you have a healthy baby. Follow-up care is a key part of your treatment and safety. Be sure to make and go to all appointments, and call your doctor if you are having problems.  It's also a good idea to know your test results and keep alist of the medicines you take. How can you care for yourself at home? Eat plenty of fruits and vegetables. Include a variety of orange, yellow, and leafy dark-green vegetables every day. Choose whole-grain bread, cereal, and pasta. Good choices include whole wheat bread, whole wheat pasta, brown rice, and oatmeal.  Get 4 or more servings of milk and milk products each day. Good choices include nonfat or low-fat milk, yogurt, and cheese. If you cannot eat milk products, you can get calcium from calcium-fortified products such as orange juice, soy milk, and tofu. Other non-milk sources of calcium include leafy green vegetables, such as broccoli, kale, mustard greens, turnip greens, bok sophia, and brussels sprouts. If you eat meat, pick lower-fat types. Good choices include lean cuts of meat and chicken or turkey without the skin. Do not eat shark, swordfish, yamilet mackerel, or tilefish. They have high levels of mercury, which is dangerous to your baby. You can eat up to 12 ounces a week of fish or shellfish that have low mercury levels. Good choices include shrimp, wild salmon, pollock, and catfish. Limit some other types of fish, such as white (albacore) tuna, to 4 oz (0.1 kg) a week. Heat lunch meats (such as turkey, ham, or bologna) to 165°F before you eat them. This reduces your risk of getting sick from a kind of bacteria that can be found in lunch meats. Do not eat unpasteurized soft cheeses, such as brie, feta, fresh mozzarella, and blue cheese. They have a bacteria that could harm your baby. Limit caffeine. If you drink coffee or tea, have no more than 1 cup a day. Caffeine is also found in jaelyn. Do not drink any alcohol. No amount of alcohol has been found to be safe during pregnancy. Do not diet or try to lose weight. For example, do not follow a low-carbohydrate diet.  If you are overweight at the start of your pregnancy, your doctor will work with you to manage your weight gain.  Tell your doctor about all vitamins and supplements you take. When should you call for help? Watch closely for changes in your health, and be sure to contact your doctor if you have any problems. Where can you learn more? Go to https://chip.iPayment. org and sign in to your [employer] account. Enter Y785 in the [facility] box to learn more about \"Nutrition During Pregnancy: Care Instructions. \"     If you do not have an account, please click on the \"Sign Up Now\" link. Current as of: September 8, 2021               Content Version: 13.3  © 2006-2022 [employer]. Care instructions adapted under license by [facility] ([facility]). If you have questions about a medical condition or this instruction, always ask your healthcare professional. [address] any warranty or liability for your use of this information. Patient Education        Weeks 6 to 10 of Your Pregnancy: Care Instructions  Overview     During the first 6 to 10 weeks of your pregnancy, your body goes through many changes. Your baby grows very quickly, even though you can't feel it yet. You may start to feel different, both in your body and your emotions. Because each pregnancy is unique, there's no right way to feel. You may feel the healthiest you've ever been, or you might feel tired or sick to your stomach (\"morningsickness\"). These early weeks are a time to make healthy choices and to eat the best foodsfor you and your baby. This is also a good time to think about birth defects testing. These are tests done during pregnancy to look for possible problems with the baby. First-trimester tests for birth defects can be done between 8 and 13 weeks of pregnancy, depending on the test. Talk with your doctor about what kinds oftests are available. Follow-up care is a key part of your treatment and safety.  Be sure to make and go to all appointments, and call your doctor if you are having problems. It's also a good idea to know your test results and keep alist of the medicines you take. How can you care for yourself at home? Eat well  Eat at least 3 meals and 2 healthy snacks every day. Eat fresh, whole foods, includin or more servings of bread, tortillas, cereal, rice, pasta, or oatmeal.  3 or more servings of vegetables, especially leafy green vegetables. 2 or more servings of fruits. 3 or more servings of milk, yogurt, or cheese. 2 or more servings of meat, turkey, chicken, fish, eggs, or dried beans. Drink plenty of fluids, especially water. Avoid sodas and other sweetened drinks. Choose foods that have important vitamins for your baby, such as calcium, iron, and folate. Dairy products, tofu, canned fish with bones, almonds, broccoli, dark leafy greens, corn tortillas, and fortified orange juice are good sources of calcium. Beef, poultry, liver, spinach, lentils, dried beans, fortified cereals, and dried fruits are rich in iron. Dark leafy greens, broccoli, asparagus, liver, fortified cereals, orange juice, peanuts, and almonds are good sources of folate. Avoid foods that could harm your baby. Do not eat raw or undercooked meat, chicken, or fish (such as sushi or raw oysters). Do not eat raw eggs or foods that contain raw eggs, such as Caesar dressing. Do not eat soft cheeses and unpasteurized dairy foods, such as Brie, feta, or blue cheese. Do not eat fish that contains a lot of mercury, such as shark, swordfish, tilefish, or yamilet mackerel. Limit some other types of fish, such as white (albacore) tuna, to 4 oz (0.1 kg) a week. Do not eat raw sprouts, especially alfalfa sprouts. Cut down on caffeine, such as coffee, tea, and cola. Protect yourself and your baby  Do not touch jamarcus litter or cat feces. They can cause an infection that could harm your baby. Avoid things that can make your body too hot and may be harmful to your baby, such as a hot tub or sauna.  Or talk with

## 2022-07-20 NOTE — PROGRESS NOTES
Yuni Oleary is here at 6w4d for:    Chief Complaint   Patient presents with    Initial Prenatal Visit       Estimated Due Date: Estimated Date of Delivery: 3/11/23    OB History    Para Term  AB Living   7 4 3 1 2 4   SAB IAB Ectopic Molar Multiple Live Births   2         4      # Outcome Date GA Lbr Melchor/2nd Weight Sex Delivery Anes PTL Lv   7 Current            6 SAB 22 3w0d    SAB   ND   5 Term 2009 38w0d 10:00 8 lb 3 oz (3.714 kg) M Vag-Spont None N RIO   4  2007 35w0d 02:00 6 lb 7 oz (2.92 kg) F Vag-Spont None Y RIO      Birth Comments:  labor at 35HNW      Complications:  Labor   3 Term 2002 37w0d 05:00 6 lb 7 oz (2.92 kg) F Vag-Spont None N RIO      Birth Comments: GDM   2 SAB 2001 13w0d    SAB   ND   1 Term 1999 41w0d 07:00 7 lb 6 oz (3.345 kg) F Vag-Spont None N RIO      Birth Comments: pre-e        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    High risk HPV infection     Kidney stone     LSIL (low grade squamous intraepithelial lesion) on Pap smear 2012    LSIL (low grade squamous intraepithelial lesion) on Pap smear 2007    Pre-eclampsia      delivery      labor     PTSD (post-traumatic stress disorder)     Type O blood, Rh negative        Past Surgical History:   Procedure Laterality Date    APPENDECTOMY      BARTHOLIN GLAND CYST EXCISION      COLONOSCOPY      [place]    COLPOSCOPY  2012    d/t LSIL    COLPOSCOPY  2007    WNL    ENDOMETRIAL BIOPSY  03/15/2013    WNL    LAPAROSCOPY  06/15/2012    dx lap WNL    LEEP      LIPOSUCTION      OTHER SURGICAL HISTORY  2009    ESSURE    TONSILLECTOMY AND ADENOIDECTOMY      TUBOPLASTY / TUBOTUBAL ANASTOMOSIS         Social History     Tobacco Use   Smoking Status Former    Packs/day: 0.25    Types: Cigarettes    Quit date: 2016    Years since quittin.6 Smokeless Tobacco Never        Social History     Substance and Sexual Activity   Alcohol Use Not Currently    Comment: rarely       No results found for this visit on 07/20/22. HPI: Here today for dating ultrasound and new OB visit. I did review ultrasound with patient at this time. Yes PT denies fever, chills, nausea and vomiting       Vitals:  Estimated body mass index is 23.34 kg/m² as calculated from the following:    Height as of this encounter: 5' 6\" (1.676 m). Weight as of this encounter: 144 lb 9.6 oz (65.6 kg). BP: 114/72  Weight: 144 lb 9.6 oz (65.6 kg)  Patient Position: Sitting               Results reviewed today:    Kaity Ochoa Jul 20, 2022  1:26 [phone]           OB TRANSVAGINAL: Patient Communication     Add Comments   Seen       Radiation Dose Estimates    No radiation information found for this patient  Narrative   Formatting of this result is different from the original.   7/20/2022 10:12 AM EDT        6.2 WK gest sac, ys- seen, no fetal pole visualized   Irregular gest sac   CL:3.7cm   RT. OVARY:seen, corpus luteum cyst toney- 2.4cm x 2.1cm x 2.0cm   LT. OVARY:seen, wnl      Repeat US for viability            ASSESSMENT & Plan    Diagnosis Orders   1. Encounter for supervision of other normal pregnancy in first trimester  C.trachomatis N.gonorrhoeae DNA, Urine    Culture, Urine    Hepatitis C Antibody    HIV Screen    Prenatal Profile I    Prenatal type and screen    Urine Drug Screen, Comprehensive      2. Amenorrhea  C.trachomatis N.gonorrhoeae DNA, Urine    Culture, Urine    Hepatitis C Antibody    HIV Screen    Prenatal Profile I    Prenatal type and screen    Urine Drug Screen, Comprehensive                I am having Seth Hicks maintain her triamcinolone, BIOTIN PO, ondansetron, tranexamic acid, valACYclovir, Prenatal MV-Min-Fe Fum-FA-DHA (PRENATAL 1 PO), progesterone, and ASPIRIN 81 PO. No follow-ups on file.     Patient Instructions   Patient Education        Learning About Pregnancy  Your Care Instructions     Your health in the early weeks of your pregnancy is particularly important for your baby's health. Take good care of yourself. Anything you do that harms yourbody can also harm your baby. Make sure to go to all of your doctor appointments. Regular checkups will helpkeep you and your baby healthy. How can you care for yourself at home? Diet    Eat a balanced diet. Make sure your diet includes plenty of beans, peas, and leafy green vegetables. Do not skip meals or go for many hours without eating. If you are nauseated, try to eat a small, healthy snack every 2 to 3 hours. Do not eat fish that has a high level of mercury, such as shark, swordfish, or mackerel. Do not eat more than one can of tuna each week. Drink plenty of fluids. If you have kidney, heart, or liver disease and have to limit fluids, talk with your doctor before you increase the amount of fluids you drink. Cut down on caffeine, such as coffee, tea, and cola. Do not drink alcohol, such as beer, wine, or hard liquor. Take a multivitamin that contains at least 400 micrograms (mcg) of folic acid to help prevent birth defects. Fortified cereal and whole wheat bread are good additional sources of folic acid. Increase the calcium in your diet. Try to drink a quart of skim milk each day. You may also take calcium supplements and choose foods such as cheese and yogurt. Lifestyle    Make sure you go to your follow-up appointments. Get plenty of rest. You may be unusually tired while you are pregnant. Get at least 30 minutes of exercise on most days of the week. Walking is a good choice. If you have not exercised in the past, start out slowly. Take several short walks each day. Do not smoke. If you need help quitting, talk to your doctor about stop-smoking programs. These can increase your chances of quitting for good.      Do not touch cat feces or pain in your belly or pelvis. Watch closely for changes in your health, and be sure to contact your doctor if:    You do not get better as expected. Where can you learn more? Go to https://GoSavetim.No Chains. org and sign in to your Apttus account. Enter O306 in the [facility] box to learn more about \"Managing Morning Sickness: Care Instructions. \"     If you do not have an account, please click on the \"Sign Up Now\" link. Current as of: February 23, 2022               Content Version: 13.3  © 2006-2022 [employer]. Care instructions adapted under license by [facility] ([facility]). If you have questions about a medical condition or this instruction, always ask your healthcare professional. [address] any warranty or liability for your use of this information. Patient Education        Nutrition During Pregnancy: Care Instructions  Overview     Healthy eating when you are pregnant is important for you and your baby. It can help you feel well and have a successful pregnancy and delivery. During pregnancy your nutrition needs increase. Even if you have excellent eating habits, your doctor may recommend a multivitamin to make sure you get enoughiron and folic acid. You may wonder how much weight you should gain. In general, if you were at a healthy weight before you became pregnant, then you should gain between 25 and 35 pounds. If you were overweight before pregnancy, then you'll likely be advised to gain 15 to 25 pounds. If you were underweight before pregnancy, then you'll probably be advised to gain 28 to 40 pounds. Your doctor will work with you to set a weight goal that is right for you. Gaining a healthy amount ofweight helps you have a healthy baby. Follow-up care is a key part of your treatment and safety. Be sure to make and go to all appointments, and call your doctor if you are having problems.  It's also a good idea to know your test results and keep alist of the medicines you take. How can you care for yourself at home? Eat plenty of fruits and vegetables. Include a variety of orange, yellow, and leafy dark-green vegetables every day. Choose whole-grain bread, cereal, and pasta. Good choices include whole wheat bread, whole wheat pasta, brown rice, and oatmeal.  Get 4 or more servings of milk and milk products each day. Good choices include nonfat or low-fat milk, yogurt, and cheese. If you cannot eat milk products, you can get calcium from calcium-fortified products such as orange juice, soy milk, and tofu. Other non-milk sources of calcium include leafy green vegetables, such as broccoli, kale, mustard greens, turnip greens, bok sophia, and brussels sprouts. If you eat meat, pick lower-fat types. Good choices include lean cuts of meat and chicken or turkey without the skin. Do not eat shark, swordfish, yamilet mackerel, or tilefish. They have high levels of mercury, which is dangerous to your baby. You can eat up to 12 ounces a week of fish or shellfish that have low mercury levels. Good choices include shrimp, wild salmon, pollock, and catfish. Limit some other types of fish, such as white (albacore) tuna, to 4 oz (0.1 kg) a week. Heat lunch meats (such as turkey, ham, or bologna) to 165°F before you eat them. This reduces your risk of getting sick from a kind of bacteria that can be found in lunch meats. Do not eat unpasteurized soft cheeses, such as brie, feta, fresh mozzarella, and blue cheese. They have a bacteria that could harm your baby. Limit caffeine. If you drink coffee or tea, have no more than 1 cup a day. Caffeine is also found in jaelyn. Do not drink any alcohol. No amount of alcohol has been found to be safe during pregnancy. Do not diet or try to lose weight. For example, do not follow a low-carbohydrate diet. If you are overweight at the start of your pregnancy, your doctor will work with you to manage your weight gain.   Tell your doctor about all vitamins and supplements you take. When should you call for help? Watch closely for changes in your health, and be sure to contact your doctor if you have any problems. Where can you learn more? Go to https://chip.[facility]. org and sign in to your ESBATech account. Enter Y785 in the [facility] box to learn more about \"Nutrition During Pregnancy: Care Instructions. \"     If you do not have an account, please click on the \"Sign Up Now\" link. Current as of: September 8, 2021               Content Version: 13.3  © 3415-8751 [employer]. Care instructions adapted under license by [facility] ([facility]). If you have questions about a medical condition or this instruction, always ask your healthcare professional. [address] any warranty or liability for your use of this information. Patient Education        Weeks 6 to 10 of Your Pregnancy: Care Instructions  Overview     During the first 6 to 10 weeks of your pregnancy, your body goes through many changes. Your baby grows very quickly, even though you can't feel it yet. You may start to feel different, both in your body and your emotions. Because each pregnancy is unique, there's no right way to feel. You may feel the healthiest you've ever been, or you might feel tired or sick to your stomach (\"morningsickness\"). These early weeks are a time to make healthy choices and to eat the best foodsfor you and your baby. This is also a good time to think about birth defects testing. These are tests done during pregnancy to look for possible problems with the baby. First-trimester tests for birth defects can be done between 8 and 13 weeks of pregnancy, depending on the test. Talk with your doctor about what kinds oftests are available. Follow-up care is a key part of your treatment and safety. Be sure to make and go to all appointments, and call your doctor if you are having problems.  It's also a good idea to know your test results and keep alist of the medicines you take. How can you care for yourself at home? Eat well  Eat at least 3 meals and 2 healthy snacks every day. Eat fresh, whole foods, includin or more servings of bread, tortillas, cereal, rice, pasta, or oatmeal.  3 or more servings of vegetables, especially leafy green vegetables. 2 or more servings of fruits. 3 or more servings of milk, yogurt, or cheese. 2 or more servings of meat, turkey, chicken, fish, eggs, or dried beans. Drink plenty of fluids, especially water. Avoid sodas and other sweetened drinks. Choose foods that have important vitamins for your baby, such as calcium, iron, and folate. Dairy products, tofu, canned fish with bones, almonds, broccoli, dark leafy greens, corn tortillas, and fortified orange juice are good sources of calcium. Beef, poultry, liver, spinach, lentils, dried beans, fortified cereals, and dried fruits are rich in iron. Dark leafy greens, broccoli, asparagus, liver, fortified cereals, orange juice, peanuts, and almonds are good sources of folate. Avoid foods that could harm your baby. Do not eat raw or undercooked meat, chicken, or fish (such as sushi or raw oysters). Do not eat raw eggs or foods that contain raw eggs, such as Caesar dressing. Do not eat soft cheeses and unpasteurized dairy foods, such as Brie, feta, or blue cheese. Do not eat fish that contains a lot of mercury, such as shark, swordfish, tilefish, or yamilet mackerel. Limit some other types of fish, such as white (albacore) tuna, to 4 oz (0.1 kg) a week. Do not eat raw sprouts, especially alfalfa sprouts. Cut down on caffeine, such as coffee, tea, and cola. Protect yourself and your baby  Do not touch jamarcus litter or cat feces. They can cause an infection that could harm your baby. Avoid things that can make your body too hot and may be harmful to your baby, such as a hot tub or sauna.  Or talk with your doctor before doing anything that raises your body temperature. Your doctor can tell you if it's safe. [place] with morning sickness  Sip small amounts of water, juices, or shakes. Try drinking between meals, not with meals. Eat 5 or 6 small meals a day. Try dry toast or crackers when you first get up, and eat breakfast a little later. Avoid spicy, greasy, and fatty foods. When you feel sick, open your windows or go for a short walk to get fresh air. Try nausea wristbands. These help some people. Tell your doctor if you think your prenatal vitamins make you sick. Where can you learn more? Go to https://chpepiceweb.healthPromedior. org and sign in to your Easiaid account. Enter G112 in the [employer] box to learn more about \"Weeks 6 to 10 of Your Pregnancy: Care Instructions. \"     If you do not have an account, please click on the \"Sign Up Now\" link. Current as of: February 23, 2022               Content Version: 13.3  © 2006-2022 [employer]. Care instructions adapted under license by [facility] ([facility]). If you have questions about a medical condition or this instruction, always ask your healthcare professional. [place] [address] any warranty or liability for your use of this information. Childbirth Education 2022 March 30 Wednesday and April 7 Thursday 530 to 9 PM    May 14 Saturday 9am -to 4 PM    Coleen 14 and 21 Tuesdays 5:30 PM to 9 PM    July 23 Saturday 9 AM to 4 PM    August 9 and 16 Tuesdays 5:30 PM to 9 PM    September 14 and 21 Wednesdays 5:30 PM to 9 PM    October 15 Saturday 9 AM to 4 PM    November 9 and 16 Wednesday 5:30 PM to 9 PM      There is no charge for classes. Please bring a pillow to class. Bring a support person.     To sign up for classes  Call the Obstetrics Department at  [facility] at  [phone]          BREASTFEEDING classes 2022    Classes are held in the [address] [facility] [address]    Breastfeeding    January 26 ZOOM CLASS- (Wednesday 12 pm-1:30 pm)       (Tuesday 11 am - 1 pm)      (Monday 5:30-7:30 pm)     5:30 PM to 7:30 PM    May 17 Tuesday 5:30 PM to 7:30 PM     530 to 7:30 PM     530 to 7:30 PM     530 to 7:30 PM     530 to 7:30 PM     530 to 7:30 PM     530 to 7:30 PM        these classes are free    To sign up for classes  call the Obstetrics Department at  [facility] at  [phone]  More classes will be added as instructor availability increases. Thank you for your patience! Learn and GO with Leo              Scan the QR Code  below to access   parent education powered  by [employer].     [employer] gives you access to:     Prenatal   Labor and birth   Postpartum care   Breastfeeding   [facility] care  Scan the QR code to access  [employer] at [facility]) -- [address] [place] [address], [facility],2022 4:42 PM

## 2022-07-20 NOTE — PROGRESS NOTES
New OB Visit    Date of service: 2022    Joanna Presley  Is a 40 y.o. single female presenting for a New OB visit with Nurse. Name of Father of Josseline Jara is Mari Rajan and is involved. Pt does work at [employer]. Pt is not Fertility pt. PT's PCP is: CHIKA Fernandez - CNP     : 1980                                             Subjective:       Patient's last menstrual period was 2022.    OB History    Para Term  AB Living   7 4 3 1 2 4   SAB IAB Ectopic Molar Multiple Live Births   2         4      # Outcome Date GA Lbr Melchor/2nd Weight Sex Delivery Anes PTL Lv   7 Current            6 SAB 22 3w0d    SAB   ND   5 Term 2009 38w0d 10:00 8 lb 3 oz (3.714 kg) M Vag-Spont None N RIO   4  2007 35w0d 02:00 6 lb 7 oz (2.92 kg) F Vag-Spont None Y RIO      Birth Comments:  labor at 55JVX      Complications:  Labor   3 Term 2002 37w0d 05:00 6 lb 7 oz (2.92 kg) F Vag-Spont None N RIO      Birth Comments: GDM   2 SAB 2001 13w0d    SAB   ND   1 Term 1999 41w0d 07:00 7 lb 6 oz (3.345 kg) F Vag-Spont None N RIO      Birth Comments: pre-e             Social History     Tobacco Use   Smoking Status Former    Packs/day: 0.25    Types: Cigarettes    Quit date: 2016    Years since quittin.6   Smokeless Tobacco Never        Social History     Substance and Sexual Activity   Alcohol Use Not Currently    Comment: rarely        Allergies: Cephalexin, Morphine, Pcn [penicillins], Codeine, and Nickel      Current Outpatient Medications:     Prenatal MV-Min-Fe Fum-FA-DHA (PRENATAL 1 PO), Take by mouth, Disp: , Rfl:     progesterone (PROMETRIUM) 100 MG CAPS capsule, Take 150 mg by mouth at bedtime, Disp: , Rfl:     tranexamic acid (LYSTEDA) 650 MG TABS tablet, Take 2 tablets 3 times daily for up to 5 days as needed for heavy bleeding, Disp: 30 tablet, Rfl: 5    valACYclovir (VALTREX) 500 MG tablet, Take 1 tablet by mouth daily (Patient not taking: snack every 2 to 3 hours. Do not eat fish that has a high level of mercury, such as shark, swordfish, or mackerel. Do not eat more than one can of tuna each week. Drink plenty of fluids. If you have kidney, heart, or liver disease and have to limit fluids, talk with your doctor before you increase the amount of fluids you drink. Cut down on caffeine, such as coffee, tea, and cola. Do not drink alcohol, such as beer, wine, or hard liquor. Take a multivitamin that contains at least 400 micrograms (mcg) of folic acid to help prevent birth defects. Fortified cereal and whole wheat bread are good additional sources of folic acid. Increase the calcium in your diet. Try to drink a quart of skim milk each day. You may also take calcium supplements and choose foods such as cheese and yogurt. Lifestyle    Make sure you go to your follow-up appointments. Get plenty of rest. You may be unusually tired while you are pregnant. Get at least 30 minutes of exercise on most days of the week. Walking is a good choice. If you have not exercised in the past, start out slowly. Take several short walks each day. Do not smoke. If you need help quitting, talk to your doctor about stop-smoking programs. These can increase your chances of quitting for good. Do not touch cat feces or litter boxes. Also, wash your hands after you handle raw meat, and fully cook all meat before you eat it. Wear gloves when you work in the yard or garden, and wash your hands well when you are done. Cat feces, raw or undercooked meat, and contaminated dirt can cause an infection that may harm your baby or lead to a miscarriage. Avoid things that can make your body too hot and may be harmful to your baby, such as a hot tub or sauna. Or talk with your doctor before doing anything that raises your body temperature. Your doctor can tell you if it's safe. Avoid chemical fumes, paint fumes, or poisons.      Do not use illegal drugs, marijuana, or alcohol. Medicines    Review all of your medicines with your doctor. Some of your routine medicines may need to be changed to protect your baby. Use acetaminophen (Tylenol) to relieve minor problems, such as a mild headache or backache or a mild fever with cold symptoms. Do not use nonsteroidal anti-inflammatory drugs (NSAIDs), such as ibuprofen (Advil, Motrin) or naproxen (Aleve), unless your doctor says it is okay. Do not take two or more pain medicines at the same time unless the doctor told you to. Many pain medicines have acetaminophen, which is Tylenol. Too much acetaminophen (Tylenol) can be harmful. Take your medicines exactly as prescribed. Call your doctor if you think you are having a problem with your medicine. To manage morning sickness    If you feel sick when you first wake up, try eating a small snack (such as crackers) before you get out of bed. Allow some time to digest the snack, and then get out of bed slowly. Do not skip meals or go for long periods without eating. An empty stomach can make nausea worse. Eat small, frequent meals instead of three large meals each day. Drink plenty of fluids. Eat foods that are high in protein but low in fat. If you are taking iron supplements, ask your doctor if they are necessary. Iron can make nausea worse. Avoid any smells, such as coffee, that make you feel sick. Get lots of rest. Morning sickness may be worse when you are tired. Follow-up care is a key part of your treatment and safety. Be sure to make and go to all appointments, and call your doctor if you are having problems. It's also a good idea to know your test results and keep alist of the medicines you take. Where can you learn more? Go to https://chip.healthTourvia.me. org and sign in to your [employer] account. Enter E661 in the RUNform box to learn more about \"Learning About Pregnancy. \"     If you do not have an account, please click on the \"Sign Up Now\" link. Current as of: February 23, 2022               Content Version: 13.3  © 2006-2022 [employer]. Care instructions adapted under license by [facility] ([facility]). If you have questions about a medical condition or this instruction, always ask your healthcare professional. [address] any warranty or liability for your use of this information. Patient Education        Managing Morning Sickness: Care Instructions  Overview     Morning sickness can be the toughest part of early pregnancy. Some people feel mildly sick to their stomach, and others are running to the bathroom. The goodnews? Morning sickness usually gets better in the second trimester. It's likely that your hormones are to blame for morning sickness. But you can do things to feel better, like changing what you eat, avoiding certain foodsand smells, and asking your doctor about medicines you can try. Follow-up care is a key part of your treatment and safety. Be sure to make and go to all appointments, and call your doctor if you are having problems. It's also a good idea to know your test results and keep alist of the medicines you take. How can you care for yourself at home? Keep food in your stomach, but not too much at once. Your nausea may be worse if your stomach is empty. Eat five or six small meals a day instead of three large meals. For morning nausea, eat a small snack, such as a couple of crackers or dry biscuits, before rising. Allow a few minutes for your stomach to settle before you get out of bed slowly. Drink plenty of fluids. If you have kidney, heart, or liver disease and have to limit fluids, talk with your doctor before you increase the amount of fluids you drink. Some women find that peppermint tea helps with nausea. Eat more protein, such as chicken, fish, lean meat, beans, nuts, and seeds.   Eat carbohydrate foods, such as potatoes, whole-grain cereals, rice, and pasta. Avoid smells and foods that make you feel nauseated. Spicy or high-fat foods, citrus juice, milk, coffee, and tea with caffeine often make nausea worse. Do not drink alcohol. Do not smoke. Try not to be around others who smoke. If you need help quitting, talk to your doctor about stop-smoking programs and medicines. These can increase your chances of quitting for good. If you are taking iron supplements, ask your doctor if they are necessary. Iron can make nausea worse. Get lots of rest. Stress and fatigue can make your morning sickness worse. Ask your doctor about taking prescription medicine, or over-the-counter products such as vitamin B6, doxylamine, or angie, to relieve your symptoms. Your doctor can tell you the doses that are safe for you. Take your prenatal vitamins at night on a full stomach. When should you call for help? Call 911 anytime you think you may need emergency care. For example, call if:    You passed out (lost consciousness). Call your doctor now or seek immediate medical care if:    You are sick to your stomach or cannot drink fluids. You have symptoms of dehydration, such as:  Dry eyes and a dry mouth. Passing only a little urine. Feeling thirstier than usual.     You are not able to keep down your medicine. You have pain in your belly or pelvis. Watch closely for changes in your health, and be sure to contact your doctor if:    You do not get better as expected. Where can you learn more? Go to https://Prysmtim.SLEDVision. org and sign in to your [email or web address] account. Enter W413 in the [facility] box to learn more about \"Managing Morning Sickness: Care Instructions. \"     If you do not have an account, please click on the \"Sign Up Now\" link. Current as of: February 23, 2022               Content Version: 13.3  © 2459-3184 Healthwise, Incorporated. Care instructions adapted under license by [facility] ([facility]).  If you have questions about a medical condition or this instruction, always ask your healthcare professional. [address] any warranty or liability for your use of this information. Patient Education        Nutrition During Pregnancy: Care Instructions  Overview     Healthy eating when you are pregnant is important for you and your baby. It can help you feel well and have a successful pregnancy and delivery. During pregnancy your nutrition needs increase. Even if you have excellent eating habits, your doctor may recommend a multivitamin to make sure you get enoughiron and folic acid. You may wonder how much weight you should gain. In general, if you were at a healthy weight before you became pregnant, then you should gain between 25 and 35 pounds. If you were overweight before pregnancy, then you'll likely be advised to gain 15 to 25 pounds. If you were underweight before pregnancy, then you'll probably be advised to gain 28 to 40 pounds. Your doctor will work with you to set a weight goal that is right for you. Gaining a healthy amount ofweight helps you have a healthy baby. Follow-up care is a key part of your treatment and safety. Be sure to make and go to all appointments, and call your doctor if you are having problems. It's also a good idea to know your test results and keep alist of the medicines you take. How can you care for yourself at home? Eat plenty of fruits and vegetables. Include a variety of orange, yellow, and leafy dark-green vegetables every day. Choose whole-grain bread, cereal, and pasta. Good choices include whole wheat bread, whole wheat pasta, brown rice, and oatmeal.  Get 4 or more servings of milk and milk products each day. Good choices include nonfat or low-fat milk, yogurt, and cheese. If you cannot eat milk products, you can get calcium from calcium-fortified products such as orange juice, soy milk, and tofu.  Other non-milk sources of calcium include leafy green vegetables, such as broccoli, kale, mustard greens, turnip greens, bok sophia, and brussels sprouts. If you eat meat, pick lower-fat types. Good choices include lean cuts of meat and chicken or turkey without the skin. Do not eat shark, swordfish, yamilet mackerel, or tilefish. They have high levels of mercury, which is dangerous to your baby. You can eat up to 12 ounces a week of fish or shellfish that have low mercury levels. Good choices include shrimp, wild salmon, pollock, and catfish. Limit some other types of fish, such as white (albacore) tuna, to 4 oz (0.1 kg) a week. Heat lunch meats (such as turkey, ham, or bologna) to 165°F before you eat them. This reduces your risk of getting sick from a kind of bacteria that can be found in lunch meats. Do not eat unpasteurized soft cheeses, such as brie, feta, fresh mozzarella, and blue cheese. They have a bacteria that could harm your baby. Limit caffeine. If you drink coffee or tea, have no more than 1 cup a day. Caffeine is also found in jaelyn. Do not drink any alcohol. No amount of alcohol has been found to be safe during pregnancy. Do not diet or try to lose weight. For example, do not follow a low-carbohydrate diet. If you are overweight at the start of your pregnancy, your doctor will work with you to manage your weight gain. Tell your doctor about all vitamins and supplements you take. When should you call for help? Watch closely for changes in your health, and be sure to contact your doctor if you have any problems. Where can you learn more? Go to https://Lightspeedpenicolleeweb.healthEvocalize. org and sign in to your [employer] account. Enter Y785 in the Sak[employer] box to learn more about \"Nutrition During Pregnancy: Care Instructions. \"     If you do not have an account, please click on the \"Sign Up Now\" link. Current as of: September 8, 2021               Content Version: 13.3  © 1935-2418 Healthwise, [employer].    Care instructions adapted under license by [facility]. If you have questions about a medical condition or this instruction, always ask your healthcare professional. [place] [address] any warranty or liability for your use of this information. Patient Education        Weeks 6 to 10 of Your Pregnancy: Care Instructions  Overview     During the first 6 to 10 weeks of your pregnancy, your body goes through many changes. Your baby grows very quickly, even though you can't feel it yet. You may start to feel different, both in your body and your emotions. Because each pregnancy is unique, there's no right way to feel. You may feel the healthiest you've ever been, or you might feel tired or sick to your stomach (\"morningsickness\"). These early weeks are a time to make healthy choices and to eat the best foodsfor you and your baby. This is also a good time to think about birth defects testing. These are tests done during pregnancy to look for possible problems with the baby. First-trimester tests for birth defects can be done between 8 and 13 weeks of pregnancy, depending on the test. Talk with your doctor about what kinds oftests are available. Follow-up care is a key part of your treatment and safety. Be sure to make and go to all appointments, and call your doctor if you are having problems. It's also a good idea to know your test results and keep alist of the medicines you take. How can you care for yourself at home? Eat well  Eat at least 3 meals and 2 healthy snacks every day. Eat fresh, whole foods, includin or more servings of bread, tortillas, cereal, rice, pasta, or oatmeal.  3 or more servings of vegetables, especially leafy green vegetables. 2 or more servings of fruits. 3 or more servings of milk, yogurt, or cheese. 2 or more servings of meat, turkey, chicken, fish, eggs, or dried beans. Drink plenty of fluids, especially water. Avoid sodas and other sweetened drinks.   Choose foods that have important vitamins for your baby, such as calcium, iron, and folate. Dairy products, tofu, canned fish with bones, almonds, broccoli, dark leafy greens, corn tortillas, and fortified orange juice are good sources of calcium. Beef, poultry, liver, spinach, lentils, dried beans, fortified cereals, and dried fruits are rich in iron. Dark leafy greens, broccoli, asparagus, liver, fortified cereals, orange juice, peanuts, and almonds are good sources of folate. Avoid foods that could harm your baby. Do not eat raw or undercooked meat, chicken, or fish (such as sushi or raw oysters). Do not eat raw eggs or foods that contain raw eggs, such as Caesar dressing. Do not eat soft cheeses and unpasteurized dairy foods, such as Brie, feta, or blue cheese. Do not eat fish that contains a lot of mercury, such as shark, swordfish, tilefish, or yamilet mackerel. Limit some other types of fish, such as white (albacore) tuna, to 4 oz (0.1 kg) a week. Do not eat raw sprouts, especially alfalfa sprouts. Cut down on caffeine, such as coffee, tea, and cola. Protect yourself and your baby  Do not touch jamarcus litter or cat feces. They can cause an infection that could harm your baby. Avoid things that can make your body too hot and may be harmful to your baby, such as a hot tub or sauna. Or talk with your doctor before doing anything that raises your body temperature. Your doctor can tell you if it's safe. [place] with morning sickness  Sip small amounts of water, juices, or shakes. Try drinking between meals, not with meals. Eat 5 or 6 small meals a day. Try dry toast or crackers when you first get up, and eat breakfast a little later. Avoid spicy, greasy, and fatty foods. When you feel sick, open your windows or go for a short walk to get fresh air. Try nausea wristbands. These help some people. Tell your doctor if you think your prenatal vitamins make you sick. Where can you learn more?   Go to https://chpepiceweb.healthCastlight [email or web address] org and sign in to your [employer] account. Enter G112 in the [facility] box to learn more about \"Weeks 6 to 10 of Your Pregnancy: Care Instructions. \"     If you do not have an account, please click on the \"Sign Up Now\" link. Current as of: February 23, 2022               Content Version: 13.3  © 3992-5085 [employer]. Care instructions adapted under license by [facility] ([facility]). If you have questions about a medical condition or this instruction, always ask your healthcare professional. [place] [address] any warranty or liability for your use of this information. Childbirth Education 2022 March 30 Wednesday and April 7 Thursday 530 to 9 PM    May 14 Saturday 9am -to 4 PM    June 14 and 21 Tuesdays 5:30 PM to 9 PM    July 23 Saturday 9 AM to 4 PM    August 9 and 16 Tuesdays 5:30 PM to 9 PM    September 14 and 21 Wednesdays 5:30 PM to 9 PM    October 15 Saturday 9 AM to 4 PM    November 9 and 16 Wednesday 5:30 PM to 9 PM      There is no charge for classes. Please bring a pillow to class. Bring a support person.     To sign up for classes  Call the Obstetrics Department at  [facility] at  [phone]          BREASTFEEDING classes 2022    Classes are held in the [address] [facility] [address]    Breastfeeding    January 26 ZOOM CLASS- (Wednesday 12 pm-1:30 pm)    February 1   (Tuesday 11 am - 1 pm)     March 7 (Monday 5:30-7:30 pm)    April 4 Monday 5:30 PM to 7:30 PM    May 17 Tuesday 5:30 PM to 7:30 PM    June 23 Thursday 530 to 7:30 PM    July 28 Thursday 530 to 7:30 PM    August 17 Wednesday 530 to 7:30 PM    September 22 Thursday 530 to 7:30 PM    October 20 Thursday 530 to 7:30 PM    November 22 Tuesday 530 to 7:30 PM        these classes are free    To sign up for classes  call the Obstetrics Department at  [facility] at  [phone]  More classes will be added as instructor

## 2022-07-21 LAB
C. TRACHOMATIS DNA ,URINE: NEGATIVE
CULTURE: NORMAL
HIV AG/AB: NONREACTIVE
N. GONORRHOEAE DNA, URINE: NEGATIVE
SPECIMEN DESCRIPTION: NORMAL
SPECIMEN DESCRIPTION: NORMAL

## 2022-07-27 ENCOUNTER — HOSPITAL ENCOUNTER (EMERGENCY)
Age: 42
Discharge: HOME | End: 2022-07-27
Attending: EMERGENCY MEDICINE
Payer: COMMERCIAL

## 2022-07-27 ENCOUNTER — NURSE TRIAGE (OUTPATIENT)
Dept: OTHER | Age: 42
End: 2022-07-27

## 2022-07-27 VITALS
DIASTOLIC BLOOD PRESSURE: 90 MMHG | TEMPERATURE: 98.2 F | HEART RATE: 74 BPM | HEIGHT: 66 IN | SYSTOLIC BLOOD PRESSURE: 139 MMHG | BODY MASS INDEX: 22.98 KG/M2 | OXYGEN SATURATION: 100 % | RESPIRATION RATE: 18 BRPM | WEIGHT: 143 LBS

## 2022-07-27 DIAGNOSIS — O20.0 THREATENED ABORTION, ANTEPARTUM: Primary | ICD-10-CM

## 2022-07-27 LAB
ABO/RH: NORMAL
ABSOLUTE EOS #: 0.18 K/UL (ref 0–0.44)
ABSOLUTE IMMATURE GRANULOCYTE: 0.06 K/UL (ref 0–0.3)
ABSOLUTE LYMPH #: 2.62 K/UL (ref 1.1–3.7)
ABSOLUTE MONO #: 0.45 K/UL (ref 0.1–1.2)
ANTIBODY SCREEN: NEGATIVE
ARM BAND NUMBER: NORMAL
BASOPHILS # BLD: 0 % (ref 0–2)
BASOPHILS ABSOLUTE: 0.03 K/UL (ref 0–0.2)
EOSINOPHILS RELATIVE PERCENT: 2 % (ref 1–4)
EXPIRATION DATE: NORMAL
HCG QUANTITATIVE: ABNORMAL MIU/ML
HCT VFR BLD CALC: 37.6 % (ref 36.3–47.1)
HEMOGLOBIN: 12.5 G/DL (ref 11.9–15.1)
IMMATURE GRANULOCYTES: 1 %
LYMPHOCYTES # BLD: 29 % (ref 24–43)
MCH RBC QN AUTO: 30.6 PG (ref 25.2–33.5)
MCHC RBC AUTO-ENTMCNC: 33.2 G/DL (ref 28.4–34.8)
MCV RBC AUTO: 92.2 FL (ref 82.6–102.9)
MONOCYTES # BLD: 5 % (ref 3–12)
NRBC AUTOMATED: 0 PER 100 WBC
PDW BLD-RTO: 12.9 % (ref 11.8–14.4)
PLATELET # BLD: ABNORMAL K/UL (ref 138–453)
PLATELET, FLUORESCENCE: 293 K/UL (ref 138–453)
PLATELET, IMMATURE FRACTION: 4.4 % (ref 1.1–10.3)
RBC # BLD: 4.08 M/UL (ref 3.95–5.11)
SEG NEUTROPHILS: 63 % (ref 36–65)
SEGMENTED NEUTROPHILS ABSOLUTE COUNT: 5.72 K/UL (ref 1.5–8.1)
WBC # BLD: 9.1 K/UL (ref 3.5–11.3)

## 2022-07-27 PROCEDURE — 36415 COLL VENOUS BLD VENIPUNCTURE: CPT

## 2022-07-27 PROCEDURE — 96372 THER/PROPH/DIAG INJ SC/IM: CPT

## 2022-07-27 PROCEDURE — 6360000002 HC RX W HCPCS: Performed by: EMERGENCY MEDICINE

## 2022-07-27 PROCEDURE — 86850 RBC ANTIBODY SCREEN: CPT

## 2022-07-27 PROCEDURE — 84702 CHORIONIC GONADOTROPIN TEST: CPT

## 2022-07-27 PROCEDURE — 99284 EMERGENCY DEPT VISIT MOD MDM: CPT

## 2022-07-27 PROCEDURE — 85025 COMPLETE CBC W/AUTO DIFF WBC: CPT

## 2022-07-27 PROCEDURE — 86900 BLOOD TYPING SEROLOGIC ABO: CPT

## 2022-07-27 PROCEDURE — 86901 BLOOD TYPING SEROLOGIC RH(D): CPT

## 2022-07-27 RX ADMIN — HUMAN RHO(D) IMMUNE GLOBULIN 300 MCG: 300 INJECTION, SOLUTION INTRAMUSCULAR at 20:40

## 2022-07-27 ASSESSMENT — ENCOUNTER SYMPTOMS
SHORTNESS OF BREATH: 0
NAUSEA: 0
VOMITING: 0

## 2022-07-27 ASSESSMENT — PAIN - FUNCTIONAL ASSESSMENT: PAIN_FUNCTIONAL_ASSESSMENT: NONE - DENIES PAIN

## 2022-07-27 NOTE — ED PROVIDER NOTES
[address] [place] [address] ED  EMERGENCY DEPARTMENT ENCOUNTER      Pt Name: Jahaira Hogan  MRN: [id number]  [place] 1980  Date of evaluation: 2022  Provider: Milena Goodman MD    CHIEF COMPLAINT       Chief Complaint   Patient presents with    Vaginal Bleeding     Pt arrived for vaginal bleeding, no clots. Pt reports she is 7 weeks pregnant. HISTORY OF PRESENT ILLNESS      Jahaira Hogan is a 40 y.o. female A1 at approximately 7w4d gestation who presents to the emergency department for evaluation of vaginal bleeding. She had a transvaginal ultrasound on  demonstrating a 6.2-week gestational sac with yolk sac but no fetal pole. States she is supposed to have a repeat ultrasound tomorrow to determine the viability of the pregnancy. However, began having bleeding this evening. Denies any abdominal pain. No nausea or vomiting. No other complaints at this time. States that she presents today because she is known to be ABO/Rh O- and needs a RhoGAM injection. REVIEW OF SYSTEMS       Review of Systems   Constitutional:  Negative for fever. Respiratory:  Negative for shortness of breath. Gastrointestinal:  Negative for nausea and vomiting. Genitourinary:  Positive for vaginal bleeding.        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    High risk HPV infection     Kidney stone     LSIL (low grade squamous intraepithelial lesion) on Pap smear 2012    LSIL (low grade squamous intraepithelial lesion) on Pap smear 2007    Pre-eclampsia      delivery      labor     PTSD (post-traumatic stress disorder)     Type O blood, Rh negative          SURGICAL HISTORY       Past Surgical History:   Procedure Laterality Date    APPENDECTOMY  2012    BARTHOLIN GLAND CYST EXCISION      COLONOSCOPY  2012    [place]    COLPOSCOPY  2012    d/t LSIL    COLPOSCOPY Rate Resp SpO2 Height Weight   07/27/22 1855 07/27/22 1855 -- 07/27/22 1855 07/27/22 1855 07/27/22 1855 07/27/22 1854 07/27/22 1854   (!) 132/92 98.2 °F (36.8 °C)  74 18 100 % 5' 6\" (1.676 m) 143 lb (64.9 kg)       Physical Exam  Constitutional:       General: She is not in acute distress. Appearance: She is not ill-appearing or diaphoretic. HENT:      Head: Normocephalic and atraumatic. Eyes:      General: No scleral icterus. Right eye: No discharge. Left eye: No discharge. Pulmonary:      Effort: Pulmonary effort is normal.   Abdominal:      General: There is no distension. Palpations: Abdomen is soft. There is no mass. Tenderness: There is no abdominal tenderness. There is no guarding or rebound. Skin:     General: Skin is warm and dry. Coloration: Skin is not jaundiced or pale. Neurological:      General: No focal deficit present. Mental Status: She is alert and oriented to person, place, and time. Psychiatric:         Mood and Affect: Mood normal.         Behavior: Behavior normal.       DIAGNOSTIC RESULTS       LABS:  Labs Reviewed   CBC WITH AUTO DIFFERENTIAL - Abnormal; Notable for the following components:       Result Value    Immature Granulocytes 1 (*)     All other components within normal limits   HCG, QUANTITATIVE, PREGNANCY - Abnormal; Notable for the following components:    hCG Quant 23,729 (*)     All other components within normal limits   IMMATURE PLATELET FRACTION   TYPE AND SCREEN   RHOGAM INJECTION ONLY       All other labs were within normal range or not returned as of this dictation. EMERGENCY DEPARTMENT COURSE and DIFFERENTIAL DIAGNOSIS/MDM:     Patient hemodynamically stable and well-appearing. Presents for evaluation of vaginal bleeding in the setting of early pregnancy. No pain. Previous ultrasound demonstrates no evidence of ectopic pregnancy.   While this ultrasound demonstrated a potential for blighted ovum, in the absence of pain or other suggestion of ectopic today I do not feel that repeat ultrasound is warranted. Quantitative hCG has increased compared to her last, though this was about 2 weeks ago and the degree of elevation is less than expected. While in the ED the patient's bleeding stopped. Her exam here is unremarkable. With no active bleeding and no pain I do not feel the pelvic exam is warranted at this time. RhoGAM injection given. Patient will keep her currently scheduled appointment for repeat ultrasound and OB follow-up tomorrow. FINAL IMPRESSION      1.  Threatened , antepartum          DISPOSITION/PLAN     DISPOSITION Decision To Discharge 2022 08:58:16 PM        (Please note that portions of this note were completed with a voice recognition program.  Efforts were made to edit the dictations but occasionally words are mis-transcribed.)    Ashley Degroot MD (electronically signed)  Attending Emergency Physician           Ashley Degroot MD  [phone]

## 2022-07-28 ENCOUNTER — ROUTINE PRENATAL (OUTPATIENT)
Dept: OBGYN | Age: 42
End: 2022-07-28

## 2022-07-28 VITALS
HEART RATE: 81 BPM | BODY MASS INDEX: 23.24 KG/M2 | SYSTOLIC BLOOD PRESSURE: 118 MMHG | DIASTOLIC BLOOD PRESSURE: 72 MMHG | WEIGHT: 144 LBS

## 2022-07-28 DIAGNOSIS — O20.9 VAGINAL BLEEDING IN PREGNANCY, FIRST TRIMESTER: ICD-10-CM

## 2022-07-28 DIAGNOSIS — Z34.90 EARLY STAGE OF PREGNANCY: Primary | ICD-10-CM

## 2022-07-28 LAB
BLD PROD TYP BPU: NORMAL
STATUS OF UNITS: NORMAL
TRANSFUSION STATUS: NORMAL
UNIT DIVISION: 0
UNIT NUMBER: NORMAL

## 2022-07-28 PROCEDURE — 0502F SUBSEQUENT PRENATAL CARE: CPT | Performed by: ADVANCED PRACTICE MIDWIFE

## 2022-07-28 NOTE — PROGRESS NOTES
Evi Patiño is here at 7w5d for: Jeremy Mendez is measuring 5 weeks 6 days today    Chief Complaint   Patient presents with    Routine Prenatal Visit     F/U in house u/s to check viability, pt is currently bleeding, pt states its not heavy, pt states she did pass a blood clot this morning        Estimated Due Date: Estimated Date of Delivery: 3/11/23    OB History    Para Term  AB Living   7 4 3 1 2 4   SAB IAB Ectopic Molar Multiple Live Births   2         4      # Outcome Date GA Lbr Melchor/2nd Weight Sex Delivery Anes PTL Lv   7 Current            6 SAB 22 3w0d    SAB   ND   5 Term 2009 38w0d 10:00 8 lb 3 oz (3.714 kg) M Vag-Spont None N RIO   4  2007 35w0d 02:00 6 lb 7 oz (2.92 kg) F Vag-Spont None Y RIO      Birth Comments:  labor at 57OCT      Complications:  Labor   3 Term 2002 37w0d 05:00 6 lb 7 oz (2.92 kg) F Vag-Spont None N RIO      Birth Comments: GDM   2 SAB 2001 13w0d    SAB   ND   1 Term 1999 41w0d 07:00 7 lb 6 oz (3.345 kg) F Vag-Spont None N RIO      Birth Comments: pre-e        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    High risk HPV infection     Kidney stone     LSIL (low grade squamous intraepithelial lesion) on Pap smear 2012    LSIL (low grade squamous intraepithelial lesion) on Pap smear 2007    Pre-eclampsia      delivery      labor     PTSD (post-traumatic stress disorder)     Type O blood, Rh negative        Past Surgical History:   Procedure Laterality Date    APPENDECTOMY  2012    BARTHOLIN GLAND CYST EXCISION      COLONOSCOPY  2012    [place]    COLPOSCOPY  2012    d/t LSIL    COLPOSCOPY  2007    WNL    ENDOMETRIAL BIOPSY  03/15/2013    WNL    LAPAROSCOPY  06/15/2012    dx lap WNL    LEEP      LIPOSUCTION      OTHER SURGICAL HISTORY  2009    ESSURE    TONSILLECTOMY AND ADENOIDECTOMY      TUBOPLASTY / TUBOTUBAL ANASTOMOSIS         Social History     Tobacco Use   Smoking Status Former    Packs/day: 0.25    Types: Cigarettes    Quit date: 2016    Years since quittin.6   Smokeless Tobacco Never        Social History     Substance and Sexual Activity   Alcohol Use Not Currently    Comment: rarely       No results found for this visit on 22. HPI: Patient here today states the bleeding got really heavy last night she did pass a clot today and now it is slowed down. Yes PT denies fever, chills, nausea and vomiting       Vitals:  Estimated body mass index is 23.24 kg/m² as calculated from the following:    Height as of 22: 5' 6\" (1.676 m). Weight as of this encounter: 144 lb (65.3 kg). BP: 118/72  Weight: 144 lb (65.3 kg)  Heart Rate: 81  Patient Position: Sitting  Movement: Absent           Abdomen: soft    Results reviewed today:    RHOGAM INJECTION ONLY  Order: [id number]  Status: Final result    Visible to patient: Yes (not seen)    Next appt: [facility] 11:30 [facility]    0 Result Notes    Component 22    Unit Number [id number]    Product Code RHIG    Unit Divison 00    Status of Units TRANSFUSED    Transfusion Status OK TO TRANSFUSE    Resulting Agency [id number]              Specimen Collected: 22 19:19 EDT Last Resulted: 22 02:25 EDT           2022 11:47 AM EDT   5.6 wk fetal pole, no FHR, ys noted, irreg shaped gest sac lies low in endometrium  No blood flow into sac  Cervix is closed         ASSESSMENT & Plan    Diagnosis Orders   1. Early stage of pregnancy        2. Vaginal bleeding in pregnancy, first trimester                  I am having Chel Raman maintain her ondansetron, valACYclovir, Prenatal MV-Min-Fe Fum-FA-DHA (PRENATAL 1 PO), progesterone, and ASPIRIN 81 PO. Return in about 1 week (around 2022) for Repeat viability ultrasound.     There are no Patient Instructions on file for this visit.           CHIKA Baldwin - KESHA,7/28/2022 12:37 PM

## 2022-07-28 NOTE — DISCHARGE INSTRUCTIONS
Keep your currently scheduled appointment tomorrow for your ultrasound. Follow-up after this appointment with your OB/GYN as directed. Return to the ED for abdominal pain, development of fever or other concerns. Laboratory studies today are reassuring, but do not necessarily tell us that you are having a normal or abnormal pregnancy.

## 2022-08-04 ENCOUNTER — ANCILLARY PROCEDURE (OUTPATIENT)
Dept: OBGYN | Age: 42
End: 2022-08-04
Payer: COMMERCIAL

## 2022-08-04 ENCOUNTER — ROUTINE PRENATAL (OUTPATIENT)
Dept: OBGYN | Age: 42
End: 2022-08-04
Payer: COMMERCIAL

## 2022-08-04 ENCOUNTER — HOSPITAL ENCOUNTER (OUTPATIENT)
Age: 42
Setting detail: SPECIMEN
Discharge: HOME | End: 2022-08-04
Payer: COMMERCIAL

## 2022-08-04 VITALS
BODY MASS INDEX: 23.14 KG/M2 | HEIGHT: 66 IN | SYSTOLIC BLOOD PRESSURE: 118 MMHG | DIASTOLIC BLOOD PRESSURE: 70 MMHG | WEIGHT: 144 LBS

## 2022-08-04 DIAGNOSIS — O36.80X0 ENCOUNTER TO DETERMINE FETAL VIABILITY OF PREGNANCY, SINGLE OR UNSPECIFIED FETUS: ICD-10-CM

## 2022-08-04 DIAGNOSIS — O03.9 SAB (SPONTANEOUS ABORTION): ICD-10-CM

## 2022-08-04 DIAGNOSIS — O03.9 SAB (SPONTANEOUS ABORTION): Primary | ICD-10-CM

## 2022-08-04 DIAGNOSIS — Z32.01 POSITIVE PREGNANCY TEST: ICD-10-CM

## 2022-08-04 LAB — HCG QUANTITATIVE: 146 MIU/ML

## 2022-08-04 PROCEDURE — 84702 CHORIONIC GONADOTROPIN TEST: CPT

## 2022-08-04 PROCEDURE — 99213 OFFICE O/P EST LOW 20 MIN: CPT | Performed by: ADVANCED PRACTICE MIDWIFE

## 2022-08-04 PROCEDURE — 76817 TRANSVAGINAL US OBSTETRIC: CPT | Performed by: OBSTETRICS & GYNECOLOGY

## 2022-08-04 NOTE — PROGRESS NOTES
PROBLEM VISIT     Date of service: 2022    Jose Raul Nichols  Is a 40 y.o.  female    PT's PCP is: CHIKA Beckett CNP     : 1980                                             Subjective:       Patient's last menstrual period was 2022. OB History    Para Term  AB Living   7 4 3 1 2 4   SAB IAB Ectopic Molar Multiple Live Births   2         4      # Outcome Date GA Lbr Melchor/2nd Weight Sex Delivery Anes PTL Lv   7             6 SAB 22 3w0d    SAB   ND   5 Term 2009 38w0d 10:00 8 lb 3 oz (3.714 kg) M Vag-Spont None N RIO   4  2007 35w0d 02:00 6 lb 7 oz (2.92 kg) F Vag-Spont None Y RIO      Birth Comments:  labor at 69GJL      Complications:  Labor   3 Term 2002 37w0d 05:00 6 lb 7 oz (2.92 kg) F Vag-Spont None N RIO      Birth Comments: GDM   2 SAB 2001 13w0d    SAB   ND   1 Term 1999 41w0d 07:00 7 lb 6 oz (3.345 kg) F Vag-Spont None N RIO      Birth Comments: pre-e        Social History     Tobacco Use   Smoking Status Former    Packs/day: 0.25    Types: Cigarettes    Quit date: 2016    Years since quittin.6   Smokeless Tobacco Never        Social History     Substance and Sexual Activity   Alcohol Use Not Currently    Comment: rarely       Social History     Substance and Sexual Activity   Sexual Activity Yes    Partners: Male    Birth control/protection: Condom       Allergies: Cephalexin, Morphine, Pcn [penicillins], Codeine, and Nickel    Chief Complaint   Patient presents with    Miscarriage     Patient presents today following in house viability u/s for miscarriage. Patient is doing ok and would like to know her next steps. PE: Vital Signs  Blood pressure 118/70, height 5' 6\" (1.676 m), weight 144 lb (65.3 kg), last menstrual period 2022, unknown if currently breastfeeding. Estimated body mass index is 23.24 kg/m² as calculated from the following:    Height as of this encounter: 5' 6\" (1.676 m). Weight as of this encounter: 144 lb (65.3 kg). No data recorded        HPI: Pt here today for follow up u/s. Pt states that she is still having bleeding and after she talked to me last she had more heavy clots      Yes  PT denies fever, chills, nausea and vomiting       Objective: soft    Results reviewed today:    2022  3:25 PM EDT   UTERUS:anteverted, inhomogeneous echo pattern     No IUP visualized     ENDO:1.0cm in thickness with blood flow into endo- probable retained products     RT. OVARY:seen, wnl     LT. OVARY:seen, wnl     Patient is actively bleeding                                                    Assessment and Plan          Diagnosis Orders   1. SAB (spontaneous )  hCG, Quantitative, Pregnancy          pt already received rhogam      I am having Corby Victor maintain her ondansetron, valACYclovir, Prenatal MV-Min-Fe Fum-FA-DHA (PRENATAL 1 PO), progesterone, and ASPIRIN 81 PO. Return for we will call with results. She was also counseled on her preventative health maintenance recommendations and follow-up. There are no Patient Instructions on file for this visit.     CHIKA Gonzales CNM,2022 5:54 PM

## 2022-08-08 DIAGNOSIS — O03.9 SAB (SPONTANEOUS ABORTION): Primary | ICD-10-CM

## 2022-08-10 ENCOUNTER — HOSPITAL ENCOUNTER (OUTPATIENT)
Age: 42
Discharge: HOME | End: 2022-08-10
Payer: COMMERCIAL

## 2022-08-10 DIAGNOSIS — O03.9 SAB (SPONTANEOUS ABORTION): ICD-10-CM

## 2022-08-10 LAB — HCG QUANTITATIVE: 19 MIU/ML

## 2022-08-10 PROCEDURE — 84702 CHORIONIC GONADOTROPIN TEST: CPT

## 2022-08-10 PROCEDURE — 36415 COLL VENOUS BLD VENIPUNCTURE: CPT

## 2022-08-15 DIAGNOSIS — O03.9 SAB (SPONTANEOUS ABORTION): Primary | ICD-10-CM

## 2022-08-18 ENCOUNTER — HOSPITAL ENCOUNTER (OUTPATIENT)
Age: 42
Discharge: HOME | End: 2022-08-18
Payer: COMMERCIAL

## 2022-08-18 DIAGNOSIS — O03.9 SAB (SPONTANEOUS ABORTION): ICD-10-CM

## 2022-08-18 LAB — HCG QUANTITATIVE: 3 MIU/ML

## 2022-08-18 PROCEDURE — 84702 CHORIONIC GONADOTROPIN TEST: CPT

## 2022-08-18 PROCEDURE — 36415 COLL VENOUS BLD VENIPUNCTURE: CPT

## 2022-08-24 ENCOUNTER — OFFICE VISIT (OUTPATIENT)
Dept: UROLOGY | Age: 42
End: 2022-08-24
Payer: COMMERCIAL

## 2022-08-24 ENCOUNTER — HOSPITAL ENCOUNTER (OUTPATIENT)
Age: 42
Setting detail: SPECIMEN
Discharge: HOME | End: 2022-08-24
Payer: COMMERCIAL

## 2022-08-24 VITALS
BODY MASS INDEX: 25.34 KG/M2 | TEMPERATURE: 97.5 F | WEIGHT: 157 LBS | DIASTOLIC BLOOD PRESSURE: 70 MMHG | SYSTOLIC BLOOD PRESSURE: 100 MMHG

## 2022-08-24 DIAGNOSIS — R30.0 DYSURIA: ICD-10-CM

## 2022-08-24 DIAGNOSIS — N20.0 RENAL STONE: ICD-10-CM

## 2022-08-24 DIAGNOSIS — R30.0 DYSURIA: Primary | ICD-10-CM

## 2022-08-24 LAB
AMORPHOUS: ABNORMAL
BACTERIA: ABNORMAL
BILIRUBIN URINE: ABNORMAL
COLOR: ABNORMAL
EPITHELIAL CELLS UA: ABNORMAL /HPF (ref 0–25)
GLUCOSE URINE: ABNORMAL
KETONES, URINE: ABNORMAL
LEUKOCYTE ESTERASE, URINE: ABNORMAL
MUCUS: ABNORMAL
NITRITE, URINE: ABNORMAL
PH UA: ABNORMAL (ref 5–9)
PROTEIN UA: ABNORMAL
RBC UA: ABNORMAL /HPF (ref 0–2)
SPECIFIC GRAVITY UA: 1.02 (ref 1.01–1.02)
TURBIDITY: ABNORMAL
URINE HGB: ABNORMAL
UROBILINOGEN, URINE: ABNORMAL
WBC UA: ABNORMAL /HPF (ref 0–5)

## 2022-08-24 PROCEDURE — 87086 URINE CULTURE/COLONY COUNT: CPT

## 2022-08-24 PROCEDURE — 99214 OFFICE O/P EST MOD 30 MIN: CPT | Performed by: UROLOGY

## 2022-08-24 PROCEDURE — 81001 URINALYSIS AUTO W/SCOPE: CPT

## 2022-08-24 RX ORDER — IBUPROFEN 200 MG
200 TABLET ORAL EVERY 6 HOURS PRN
COMMUNITY

## 2022-08-24 RX ORDER — CIPROFLOXACIN 500 MG/1
500 TABLET, FILM COATED ORAL 2 TIMES DAILY
Qty: 14 TABLET | Refills: 0 | Status: SHIPPED | OUTPATIENT
Start: 2022-08-24 | End: 2022-08-31

## 2022-08-24 RX ORDER — PHENAZOPYRIDINE HYDROCHLORIDE 100 MG/1
100 TABLET, FILM COATED ORAL 3 TIMES DAILY PRN
Qty: 30 TABLET | Refills: 0 | Status: SHIPPED | OUTPATIENT
Start: 2022-08-24 | End: 2023-08-24

## 2022-08-24 RX ORDER — KETOROLAC TROMETHAMINE 10 MG/1
10 TABLET, FILM COATED ORAL EVERY 6 HOURS PRN
Qty: 20 TABLET | Refills: 0 | Status: SHIPPED | OUTPATIENT
Start: 2022-08-24 | End: 2023-08-24

## 2022-08-24 ASSESSMENT — ENCOUNTER SYMPTOMS
COUGH: 0
APNEA: 0
COLOR CHANGE: 0
BACK PAIN: 0
SHORTNESS OF BREATH: 0
EYE REDNESS: 0
NAUSEA: 0
ABDOMINAL PAIN: 0
CONSTIPATION: 0
WHEEZING: 0
VOMITING: 0

## 2022-08-24 NOTE — PROGRESS NOTES
HPI:        Patient is a 40 y.o. female in no acute distress. She is alert and oriented to person, place, and time. History   spontaneous stone passage      CT showed bilateral punctate stones     2021 New patient for intermittent pain on urination. We have not seen patient since . She states that she had a urine dipstick that was positive where she works and she was treated with a course of Bactrim. After antibiotics were completed the dipstick was still positive. A urine culture was never done. She is complaining of pain after urination. Patient with left flank pain as well. She denies any gross hematuria, frequency, urgency, nocturia or incontinence. She does have regular periods. She states she has daily bowel movements. She denies consumption of known bladder irritants. Currently  Patient is here today with urethral pain. We had seen the patient approximately 6 months ago. This was for hematuria. Patient does report in the last 6 months that she has had 2 miscarriages. Currently she is experiencing pain that is in the urethral area this is radiating towards her back. She is not having any flank pain. She does have dark-colored urine today. But she is taking over-the-counter Azo. Patient has no nausea vomiting or fevers. She is very uncomfortable today. Patient is having a daily bowel movement. She reports no pain with intercourse. Patient does state that she had recent STI testing per OB/GYN. Pain is moderate today.   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    High risk HPV infection     Kidney stone     LSIL (low grade squamous intraepithelial lesion) on Pap smear 2012    LSIL (low grade squamous intraepithelial lesion) on Pap smear 2007    Pre-eclampsia      delivery      labor     PTSD (post-traumatic stress disorder)     Type O blood, Rh negative      Past Surgical History:   Procedure Laterality Date    APPENDECTOMY  2012    BARTHOLIN GLAND CYST EXCISION      COLONOSCOPY  2012    [place]    COLPOSCOPY  06/01/2012    d/t LSIL    COLPOSCOPY  08/30/2007    WNL    ENDOMETRIAL BIOPSY  03/15/2013    WNL    LAPAROSCOPY  06/15/2012    dx lap WNL    LEEP      LIPOSUCTION      OTHER SURGICAL HISTORY  04/24/2009    ESSURE    TONSILLECTOMY AND ADENOIDECTOMY      TUBOPLASTY / TUBOTUBAL ANASTOMOSIS       Outpatient Encounter Medications as of 8/24/2022   Medication Sig Dispense Refill    Prenatal MV-Min-Fe Fum-FA-DHA (PRENATAL 1 PO) Take by mouth      progesterone (PROMETRIUM) 100 MG CAPS capsule Take 150 mg by mouth at bedtime (Patient not taking: No sig reported)      ASPIRIN 81 PO Take by mouth (Patient not taking: No sig reported)      valACYclovir (VALTREX) 500 MG tablet Take 1 tablet by mouth daily (Patient not taking: No sig reported) 30 tablet 12    ondansetron (ZOFRAN) 4 MG tablet Take 1 tablet by mouth 3 times daily as needed for Nausea or Vomiting (Patient not taking: No sig reported) 30 tablet 0     No facility-administered encounter medications on file as of 8/24/2022. Current Outpatient Medications on File Prior to Visit   Medication Sig Dispense Refill    Prenatal MV-Min-Fe Fum-FA-DHA (PRENATAL 1 PO) Take by mouth      progesterone (PROMETRIUM) 100 MG CAPS capsule Take 150 mg by mouth at bedtime (Patient not taking: No sig reported)      ASPIRIN 81 PO Take by mouth (Patient not taking: No sig reported)      valACYclovir (VALTREX) 500 MG tablet Take 1 tablet by mouth daily (Patient not taking: No sig reported) 30 tablet 12    ondansetron (ZOFRAN) 4 MG tablet Take 1 tablet by mouth 3 times daily as needed for Nausea or Vomiting (Patient not taking: No sig reported) 30 tablet 0     No current facility-administered medications on file prior to visit.      Cephalexin, Morphine, Pcn [penicillins], Codeine, and Nickel  Family History   Problem Relation Age of Onset    Diabetes Paternal Grandfather     Heart Disease Paternal Grandfather     Heart Disease Paternal Grandmother     Diabetes Maternal Grandmother     Ovarian Cancer Maternal Grandmother     Hypertension Maternal Grandfather     Diabetes Maternal Grandfather     Hypertension Father     Other Father         diverticulitis    Diabetes Mother     Other Brother         diverticulitis    Other Brother         ulcerative coloitis    No Known Problems Brother     No Known Problems Sister     Ovarian Cancer Maternal Aunt      Social History     Tobacco Use   Smoking Status Former    Packs/day: 0.25    Types: Cigarettes    Quit date: 2016    Years since quittin.7   Smokeless Tobacco Never       Social History     Substance and Sexual Activity   Alcohol Use Not Currently    Comment: rarely       Review of Systems   Constitutional:  Negative for appetite change, chills and fever. Eyes:  Negative for redness and visual disturbance. Respiratory:  Negative for apnea, cough, shortness of breath and wheezing. Cardiovascular:  Negative for chest pain and leg swelling. Gastrointestinal:  Negative for abdominal pain, constipation, nausea and vomiting. Genitourinary:  Positive for dysuria. Negative for difficulty urinating, dyspareunia, enuresis, flank pain, frequency, hematuria, pelvic pain, urgency, vaginal bleeding and vaginal discharge. Musculoskeletal:  Negative for back pain, joint swelling and myalgias. Skin:  Negative for color change, rash and wound. Neurological:  Negative for dizziness, tremors and numbness. Hematological:  Negative for adenopathy. Does not bruise/bleed easily. Psychiatric/Behavioral:  Negative for sleep disturbance. Temp 97.5 °F (36.4 °C)   Wt 157 lb (71.2 kg)   BMI 25.34 kg/m²       PHYSICAL EXAM:  Constitutional: Patient resting comfortably, in no acute distress. Neuro: Alert and oriented to person place and time. Cranial nerves grossly intact. Psych: Mood and affect normal.  Skin: Warm, dry  HEENT: normocephalic, atraumatic  Lymphatics: No palpable lymphadenopathy  Lungs: Respiratory effort normal, unlabored  Cardiovascular:  Normal peripheral pulses  Abdomen: Soft, non-tender, non-distended with no organomegaly or palpable masses. : No CVA tenderness. Bladder non-tender and not distended. Pelvic: deferred    Lab Results   Component Value Date    BUN 16 03/09/2022     Lab Results   Component Value Date    CREATININE 0.71 03/09/2022       ASSESSMENT:  This is a 40 y.o. female with the following diagnoses:   Diagnosis Orders   1. Dysuria  Urinalysis with Microscopic    Culture, Urine    ciprofloxacin (CIPRO) 500 MG tablet    phenazopyridine (PYRIDIUM) 100 MG tablet    ketorolac (TORADOL) 10 MG tablet      2. Renal stone  ciprofloxacin (CIPRO) 500 MG tablet    phenazopyridine (PYRIDIUM) 100 MG tablet    ketorolac (TORADOL) 10 MG tablet             PLAN:  Patient will follow up in 2 to 3 weeks. We did start her on empiric ciprofloxacin today. We also checked her urine for culture and sensitivity. We did order her full strength Pyridium. We also gave her some Toradol.

## 2022-08-25 LAB
CULTURE: NORMAL
SPECIMEN DESCRIPTION: NORMAL

## 2022-08-26 ENCOUNTER — HOSPITAL ENCOUNTER (OUTPATIENT)
Dept: CT IMAGING | Age: 42
Discharge: HOME | End: 2022-08-28
Payer: COMMERCIAL

## 2022-08-26 ENCOUNTER — TELEPHONE (OUTPATIENT)
Dept: UROLOGY | Age: 42
End: 2022-08-26

## 2022-08-26 DIAGNOSIS — R30.0 DYSURIA: ICD-10-CM

## 2022-08-26 DIAGNOSIS — N23 RENAL COLIC: ICD-10-CM

## 2022-08-26 DIAGNOSIS — R31.29 MICROSCOPIC HEMATURIA: ICD-10-CM

## 2022-08-26 DIAGNOSIS — N23 RENAL COLIC: Primary | ICD-10-CM

## 2022-08-26 PROCEDURE — 74176 CT ABD & PELVIS W/O CONTRAST: CPT

## 2022-08-31 ENCOUNTER — TELEPHONE (OUTPATIENT)
Dept: UROLOGY | Age: 42
End: 2022-08-31

## 2022-08-31 ENCOUNTER — HOSPITAL ENCOUNTER (OUTPATIENT)
Age: 42
Setting detail: SPECIMEN
Discharge: HOME | End: 2022-08-31
Payer: COMMERCIAL

## 2022-08-31 ENCOUNTER — OFFICE VISIT (OUTPATIENT)
Dept: UROLOGY | Age: 42
End: 2022-08-31
Payer: COMMERCIAL

## 2022-08-31 ENCOUNTER — HOSPITAL ENCOUNTER (OUTPATIENT)
Age: 42
Discharge: HOME | End: 2022-08-31
Payer: COMMERCIAL

## 2022-08-31 VITALS
BODY MASS INDEX: 25.95 KG/M2 | TEMPERATURE: 97.7 F | HEART RATE: 92 BPM | WEIGHT: 152 LBS | SYSTOLIC BLOOD PRESSURE: 149 MMHG | HEIGHT: 64 IN | DIASTOLIC BLOOD PRESSURE: 94 MMHG

## 2022-08-31 DIAGNOSIS — R39.89 URETHRAL PAIN: ICD-10-CM

## 2022-08-31 DIAGNOSIS — R39.89 URETHRAL PAIN: Primary | ICD-10-CM

## 2022-08-31 DIAGNOSIS — Z32.00 POSSIBLE PREGNANCY, NOT CONFIRMED: Primary | ICD-10-CM

## 2022-08-31 DIAGNOSIS — Z32.00 POSSIBLE PREGNANCY, NOT CONFIRMED: ICD-10-CM

## 2022-08-31 LAB
DIRECT EXAM: NORMAL
HCG QUANTITATIVE: <1 MIU/ML
SPECIMEN DESCRIPTION: NORMAL

## 2022-08-31 PROCEDURE — 87210 SMEAR WET MOUNT SALINE/INK: CPT

## 2022-08-31 PROCEDURE — 99213 OFFICE O/P EST LOW 20 MIN: CPT | Performed by: PHYSICIAN ASSISTANT

## 2022-08-31 PROCEDURE — 36415 COLL VENOUS BLD VENIPUNCTURE: CPT

## 2022-08-31 PROCEDURE — 84702 CHORIONIC GONADOTROPIN TEST: CPT

## 2022-08-31 ASSESSMENT — ENCOUNTER SYMPTOMS
BACK PAIN: 0
EYE REDNESS: 0
APNEA: 0
SHORTNESS OF BREATH: 0
NAUSEA: 0
WHEEZING: 0
CONSTIPATION: 0
COLOR CHANGE: 0
ABDOMINAL PAIN: 0
COUGH: 0
VOMITING: 0

## 2022-08-31 NOTE — PROGRESS NOTES
HPI:    Patient is a 40 y.o. female in no acute distress. She is alert and oriented to person, place, and time. History  2013 spontaneous stone passage     2016 CT showed bilateral punctate stones     2/23/2021 New patient for intermittent pain on urination. We have not seen patient since 2016. She states that she had a urine dipstick that was positive where she works and she was treated with a course of Bactrim. After antibiotics were completed the dipstick was still positive. A urine culture was never done. She is complaining of pain after urination. Patient with left flank pain as well. She denies any gross hematuria, frequency, urgency, nocturia or incontinence. She does have regular periods. She states she has daily bowel movements. She denies consumption of known bladder irritants. 3/2022 negative hematuria workup      Today:  Patient is here today for urethral pain. Patient states that she is going to have an H CG test per OB/GYN. She states that when she had her urine tested on 8/24/2022 she was not having her period. She states that she took a few days of antibiotics but then got a call from our office that her urine culture was negative for infection. She did stop the antibiotics. She states that she drinks plenty water and does avoid caffeine. She does have a daily bowel move which is soft and easy to pass. We did order a CT abdomen pelvis without contrast to further evaluate her discomfort. There is no obstructing uropathy. Patient does have punctate stones bilateral kidneys. There is no radiographic cause for her symptoms on this unenhanced study.      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    High risk HPV infection     Kidney stone     LSIL (low grade squamous intraepithelial lesion) on Pap smear 05/09/2012    LSIL (low grade squamous intraepithelial lesion) on Pap smear 03/21/2007 Pre-eclampsia      delivery      labor     PTSD (post-traumatic stress disorder)     Type O blood, Rh negative      Past Surgical History:   Procedure Laterality Date    APPENDECTOMY      BARTHOLIN GLAND CYST EXCISION      COLONOSCOPY  2012    [place]    COLPOSCOPY  2012    d/t LSIL    COLPOSCOPY  2007    [facility]    ENDOMETRIAL BIOPSY  03/15/2013    WNL    LAPAROSCOPY  06/15/2012    dx lap WNL    LEEP      LIPOSUCTION      OTHER SURGICAL HISTORY  2009    ESSURE    TONSILLECTOMY AND ADENOIDECTOMY      TUBOPLASTY / TUBOTUBAL ANASTOMOSIS       Outpatient Encounter Medications as of 2022   Medication Sig Dispense Refill    ibuprofen (ADVIL;MOTRIN) 200 MG tablet Take 200 mg by mouth every 6 hours as needed for Pain (Patient not taking: Reported on 2022)      ciprofloxacin (CIPRO) 500 MG tablet Take 1 tablet by mouth 2 times daily for 7 days (Patient not taking: Reported on 2022) 14 tablet 0    phenazopyridine (PYRIDIUM) 100 MG tablet Take 1 tablet by mouth 3 times daily as needed for Pain (Patient not taking: Reported on 2022) 30 tablet 0    ketorolac (TORADOL) 10 MG tablet Take 1 tablet by mouth every 6 hours as needed for Pain (Patient not taking: Reported on 2022) 20 tablet 0    Prenatal MV-Min-Fe Fum-FA-DHA (PRENATAL 1 PO) Take by mouth (Patient not taking: No sig reported)      progesterone (PROMETRIUM) 100 MG CAPS capsule Take 150 mg by mouth at bedtime (Patient not taking: No sig reported)      ASPIRIN 81 PO Take by mouth (Patient not taking: No sig reported)      valACYclovir (VALTREX) 500 MG tablet Take 1 tablet by mouth daily (Patient not taking: No sig reported) 30 tablet 12    ondansetron (ZOFRAN) 4 MG tablet Take 1 tablet by mouth 3 times daily as needed for Nausea or Vomiting (Patient not taking: No sig reported) 30 tablet 0     No facility-administered encounter medications on file as of 2022.       Current Outpatient Medications on File Prior Smoking Status Former    Packs/day: 0.25    Types: Cigarettes    Quit date: 2016    Years since quittin.7   Smokeless Tobacco Never       Social History     Substance and Sexual Activity   Alcohol Use Not Currently    Comment: rarely       Review of Systems   Constitutional:  Negative for appetite change, chills and fever. Eyes:  Negative for redness and visual disturbance. Respiratory:  Negative for apnea, cough, shortness of breath and wheezing. Cardiovascular:  Negative for chest pain and leg swelling. Gastrointestinal:  Negative for abdominal pain, constipation, nausea and vomiting. Genitourinary:  Negative for difficulty urinating, dyspareunia, dysuria, enuresis, flank pain, frequency, hematuria, pelvic pain, urgency, vaginal bleeding and vaginal discharge. Musculoskeletal:  Negative for back pain, joint swelling and myalgias. Skin:  Negative for color change, rash and wound. Neurological:  Negative for dizziness, tremors and numbness. Hematological:  Negative for adenopathy. Does not bruise/bleed easily. Psychiatric/Behavioral:  Negative for sleep disturbance. BP (!) 149/94   Pulse 92   Temp 97.7 °F (36.5 °C) (Infrared)   Ht 5' 4\" (1.626 m)   Wt 152 lb (68.9 kg)   Breastfeeding No   BMI 26.09 kg/m²       PHYSICAL EXAM:  Constitutional: Patient resting comfortably, in no acute distress. Neuro: Alert and oriented to person place and time. Psych: Mood and affect normal.  HEENT: normocephalic, atraumatic  Lungs: Respiratory effort normal, unlabored  Abdomen: Soft, non-tender, non-distended   : No CVA tenderness. Bladder non-tender and not distended. Pelvic: Normal anatomy, no irritation around urethra, no sign of yeast infection, no tenderness    Lab Results   Component Value Date    BUN 16 2022     Lab Results   Component Value Date    CREATININE 0.71 2022       ASSESSMENT:     Diagnosis Orders   1.  Urethral pain  Wet Prep, Genital PLAN:  We will check a wet prep and call her with the results    If patient develops symptoms again she is to call our office and we will have her come in for a PCR urine test    Patient did have a cystoscopy earlier in the year and also had 2 CT scans this year which did not reveal any source of her urethral pain.     Follow-up in 1 year with KUB for stone follow-up

## 2022-08-31 NOTE — TELEPHONE ENCOUNTER
----- [id number] sent at 8/31/2022  4:03 PM EDT -----  Please let her know that her vaginal swab was negative

## 2022-11-21 ENCOUNTER — PATIENT MESSAGE (OUTPATIENT)
Dept: OBGYN | Age: 42
End: 2022-11-21

## 2022-11-21 DIAGNOSIS — R10.2 PELVIC PAIN: Primary | ICD-10-CM

## 2022-11-21 NOTE — TELEPHONE ENCOUNTER
From: Stacey Retana  To: Phillip Law APRN - CNM  Sent: 11/21/2022 12:10 PM EST  Subject: Pain    [facility],  At the times of both recent miscarriages I had sharp, consistent pain in a certain spot on my lower spine (around the L3-4) that was pretty significant with the cramping. I've experienced this was when I was young and first started my cycles and it was so severe that I'd vomit. We never knew why or what it was because I was 11-12 and didnt see anyone. I hadn't had the issue since. Since the miscarriages when I cramp each cycle, it hurts quite a bit in this area. It is directly on the spine in that one spot only. Since I've been on the progesterone my cycles are 4 days instead of 7+ and only very heavy day 2-3 which is great, but this pain is what is strange for me. What do you think this could be? I've also had a difficult time holding my urine and I've NEVER had this, even after the kids were born. If we need to take a look at anything I'd like to do it before Jan. as my deductible is met.

## 2022-11-23 ENCOUNTER — TELEPHONE (OUTPATIENT)
Dept: UROLOGY | Age: 42
End: 2022-11-23

## 2022-11-23 DIAGNOSIS — R30.0 DYSURIA: ICD-10-CM

## 2022-11-23 DIAGNOSIS — N20.0 RENAL STONE: ICD-10-CM

## 2022-11-23 RX ORDER — KETOROLAC TROMETHAMINE 10 MG/1
10 TABLET, FILM COATED ORAL EVERY 6 HOURS PRN
Qty: 20 TABLET | Refills: 0 | Status: SHIPPED | OUTPATIENT
Start: 2022-11-23 | End: 2023-11-23

## 2022-11-23 NOTE — TELEPHONE ENCOUNTER
Jeannettelucinda Luis states that at her last visit with Jose Luis Richards, she was to call the office if she develops UTI symptoms that a PCR urine would be done. PLAN: 8/31/2022 OV note  We will check a wet prep and call her with the results     If patient develops symptoms again she is to call our office and we will have her come in for a PCR urine test       Writer did have the patient come to the office and gave a urine sample for the PCR testing.

## 2022-11-28 ENCOUNTER — TELEPHONE (OUTPATIENT)
Dept: UROLOGY | Age: 42
End: 2022-11-28

## 2022-11-28 ENCOUNTER — HOSPITAL ENCOUNTER (OUTPATIENT)
Dept: ULTRASOUND IMAGING | Age: 42
Discharge: HOME | End: 2022-11-30
Payer: COMMERCIAL

## 2022-11-28 DIAGNOSIS — R10.2 PELVIC PAIN: ICD-10-CM

## 2022-11-28 PROCEDURE — 76830 TRANSVAGINAL US NON-OB: CPT

## 2022-11-28 RX ORDER — LEVOFLOXACIN 500 MG/1
500 TABLET, FILM COATED ORAL DAILY
Qty: 7 TABLET | Refills: 0 | Status: SHIPPED | OUTPATIENT
Start: 2022-11-28 | End: 2022-12-05

## 2022-11-28 RX ORDER — METRONIDAZOLE 500 MG/1
500 TABLET ORAL 2 TIMES DAILY
Qty: 14 TABLET | Refills: 0 | Status: SHIPPED | OUTPATIENT
Start: 2022-11-28 | End: 2022-12-05

## 2022-11-28 NOTE — TELEPHONE ENCOUNTER
Patient made aware positive culture and to take both antibiotics. Additional instructions given per note.

## 2022-11-28 NOTE — TELEPHONE ENCOUNTER
Patient contacted the office stating she was wondering if the type of Infection she has been diagnosed with can be contagious- Gardnerella vaginalis and another bacteria . She explained her Boyfriend has also recently been c/o urinary symptoms. Advised the patient the providers would be informed of her questions/concerns and the office would then be back in contact with her. She voiced understanding.

## 2022-11-28 NOTE — TELEPHONE ENCOUNTER
Please let her know that Gardnerella infection is not transmitted to her sexual partner. The other bacteria is Ureaplasma. This is also not transmitted to sexual partner. Both of these bacteria are causes of bacterial vaginosis.

## 2022-11-28 NOTE — TELEPHONE ENCOUNTER
Please call her and let her know that we did receive the PCR urine testing. She is positive for Gardnerella vaginalis and another bacteria. We will treat her with a course of Flagyl. Please make her aware that Flagyl can cause a metallic taste in her mouth and alcohol should be avoided while on this medication. We will also need to treated with additional antibiotic Levaquin. Take this with food and eat yogurt once per day to prevent GI upset. We recommend that she takes a daily probiotic, if you develop nausea, vomiting, or fevers call the office or go to the ER. If your urinary symptoms do not improve once completing the antibiotics call our office.

## 2022-12-21 ENCOUNTER — PROCEDURE VISIT (OUTPATIENT)
Dept: OBGYN | Age: 42
End: 2022-12-21

## 2022-12-21 ENCOUNTER — HOSPITAL ENCOUNTER (OUTPATIENT)
Age: 42
Setting detail: SPECIMEN
Discharge: HOME | End: 2022-12-21
Payer: COMMERCIAL

## 2022-12-21 VITALS
WEIGHT: 149 LBS | HEIGHT: 64 IN | DIASTOLIC BLOOD PRESSURE: 80 MMHG | BODY MASS INDEX: 25.44 KG/M2 | SYSTOLIC BLOOD PRESSURE: 130 MMHG

## 2022-12-21 DIAGNOSIS — R93.89 ENDOMETRIAL THICKENING ON ULTRASOUND: ICD-10-CM

## 2022-12-21 DIAGNOSIS — R93.89 ENDOMETRIAL THICKENING ON ULTRASOUND: Primary | ICD-10-CM

## 2022-12-21 PROCEDURE — 88305 TISSUE EXAM BY PATHOLOGIST: CPT

## 2022-12-21 NOTE — PROGRESS NOTES
PROBLEM VISIT     Date of service: 2022    Katie Xiao  Is a 40 y.o. single female    PT's PCP is: CHIKA Kramer - CNP     : 1980                                             Subjective:       Patient's last menstrual period was 2022 (exact date).    OB History    Para Term  AB Living   7 4 3 1 2 4   SAB IAB Ectopic Molar Multiple Live Births   2         4      # Outcome Date GA Lbr Melchor/2nd Weight Sex Delivery Anes PTL Lv   7             6 SAB 22 3w0d    SAB   ND   5 Term 2009 38w0d 10:00 8 lb 3 oz (3.714 kg) M Vag-Spont None N RIO   4  2007 35w0d 02:00 6 lb 7 oz (2.92 kg) F Vag-Spont None Y RIO      Birth Comments:  labor at 62NQI      Complications:  Labor   3 Term 2002 37w0d 05:00 6 lb 7 oz (2.92 kg) F Vag-Spont None N RIO      Birth Comments: GDM   2 SAB 2001 13w0d    SAB   ND   1 Term 1999 41w0d 07:00 7 lb 6 oz (3.345 kg) F Vag-Spont None N RIO      Birth Comments: pre-e        Social History     Tobacco Use   Smoking Status Former    Packs/day: 0.25    Types: Cigarettes    Quit date: 2016    Years since quittin.0   Smokeless Tobacco Never        Social History     Substance and Sexual Activity   Alcohol Use Not Currently    Comment: rarely       Allergies: Cephalexin, Morphine, Pcn [penicillins], Codeine, and Nickel      Current Outpatient Medications:     Hyoscyamine Sulfate SL (LEVSIN/SL) 0.125 MG SUBL, Place 0.125 mg under the tongue every 4 hours as needed (abdominal pain), Disp: 42 each, Rfl: 0    omeprazole (PRILOSEC) 40 MG delayed release capsule, Take 1 capsule by mouth every morning (before breakfast), Disp: 90 capsule, Rfl: 1    progesterone (PROMETRIUM) 100 MG CAPS capsule, Take 150 mg by mouth at bedtime, Disp: , Rfl:     ketorolac (TORADOL) 10 MG tablet, Take 1 tablet by mouth every 6 hours as needed for Pain (Patient not taking: Reported on 2022), Disp: 20 tablet, Rfl: 0    ondansetron (ZOFRAN) 4 MG tablet, Take 1 tablet by mouth every 8 hours as needed for Nausea or Vomiting (Patient not taking: Reported on 12/21/2022), Disp: 30 tablet, Rfl: 0    ibuprofen (ADVIL;MOTRIN) 200 MG tablet, Take 200 mg by mouth every 6 hours as needed for Pain (Patient not taking: No sig reported), Disp: , Rfl:     phenazopyridine (PYRIDIUM) 100 MG tablet, Take 1 tablet by mouth 3 times daily as needed for Pain (Patient not taking: No sig reported), Disp: 30 tablet, Rfl: 0    Prenatal MV-Min-Fe Fum-FA-DHA (PRENATAL 1 PO), Take by mouth (Patient not taking: No sig reported), Disp: , Rfl:     ASPIRIN 81 PO, Take by mouth (Patient not taking: No sig reported), Disp: , Rfl:     valACYclovir (VALTREX) 500 MG tablet, Take 1 tablet by mouth daily (Patient not taking: No sig reported), Disp: 30 tablet, Rfl: 12    Social History     Substance and Sexual Activity   Sexual Activity Yes    Partners: Male    Birth control/protection: Condom       Last Yearly:  2/17/21    Last pap: 2/17/21    Last HPV: 8/12/19    Chief Complaint   Patient presents with    Procedure     Pt is here today for endometrial biopsy. Pt had usn on 11/18/22 that showed thickening of the endometrium. NURSE: LMD    PE:  Vital Signs  Blood pressure 130/80, height 5' 4\" (1.626 m), weight 149 lb (67.6 kg), last menstrual period 12/11/2022, not currently breastfeeding. Labs:    No results found for this visit on 12/21/22. HPI: The patient is here today for an endometrial biopsy as there was endometrial thickening noted on ultrasound. Yes PT denies fever, chills, nausea and vomiting       Objective  Lymphatic:   no lymphadenopathy  Heent:   negative         GI: Abdomen soft, non-tender.  BS normal. No masses,  No organomegaly           Extremities: normal strength, tone, and muscle mass   Breasts: Breast: Not examined today   Pelvic Exam: GENITAL/URINARY:  External Genitalia:  General appearance; normal, Hair distribution; normal, Lesions absent  Vagina:  General appearance normal, Estrogen effect normal, Discharge absent, Lesions absent, Pelvic support normal  Cervix:  General appearance normal, Lesions absent, Discharge absent, Tenderness absent, Enlargement absent, Nodularity absent  Uterus:  Size normal, Contour normal, Position normal, Masses absent, Consistency; normal, Support normal, Tenderness absent  Adenexa: Masses absent, Tenderness absent, Enlargement absent, Nodularity absent                                    Vaginal discharge: no vaginal discharge                          Assessment and Plan: Cervix is well visualized on speculum exam.  The cervix was thoroughly painted with Betadine. A Pipelle was readily placed into the endometrial canal with a depth of 7.5 cm. Tissue noted in the Pipelle and sent for pathology          Diagnosis Orders   1. Endometrial thickening on ultrasound  CT BIOPSY OF UTERUS LINING    Surgical Pathology                I am having Divina Zacarias maintain her valACYclovir, Prenatal MV-Min-Fe Fum-FA-DHA (PRENATAL 1 PO), progesterone, ASPIRIN 81 PO, ibuprofen, phenazopyridine, ondansetron, omeprazole, ketorolac, and Hyoscyamine Sulfate SL. No follow-ups on file. There are no Patient Instructions on file for this visit. Over 50% of time spent on counseling and care coordination on: see assessment and plan,  She was also counseled on her preventative health maintenance recommendations and follow-up.         FF time: 10 min      Elvie Maier MD,12/21/2022 10:13 AM

## 2022-12-23 LAB — SURGICAL PATHOLOGY REPORT: NORMAL

## 2023-01-27 ENCOUNTER — OFFICE VISIT (OUTPATIENT)
Dept: GASTROENTEROLOGY | Age: 43
End: 2023-01-27

## 2023-01-27 ENCOUNTER — TELEPHONE (OUTPATIENT)
Dept: GASTROENTEROLOGY | Age: 43
End: 2023-01-27

## 2023-01-27 VITALS
BODY MASS INDEX: 24.44 KG/M2 | DIASTOLIC BLOOD PRESSURE: 73 MMHG | WEIGHT: 152.1 LBS | HEIGHT: 66 IN | RESPIRATION RATE: 16 BRPM | TEMPERATURE: 97.1 F | HEART RATE: 85 BPM | SYSTOLIC BLOOD PRESSURE: 117 MMHG

## 2023-01-27 DIAGNOSIS — R19.5 LOOSE STOOLS: ICD-10-CM

## 2023-01-27 DIAGNOSIS — Z83.79 FAMILY HISTORY OF CHRONIC ULCERATIVE COLITIS: ICD-10-CM

## 2023-01-27 DIAGNOSIS — Z87.19 HISTORY OF DIVERTICULITIS: ICD-10-CM

## 2023-01-27 DIAGNOSIS — R14.0 BLOATING: Primary | ICD-10-CM

## 2023-01-27 NOTE — PROGRESS NOTES
[address]  [address]    Chief Complaint   Patient presents with    New Patient     New pt here for diarrhea; pt states diarrhea getting better but having some slow moving bowel movements; pt states recently diagnosed with diverticulitis         ZANDER CLARKE [facility] [place] is a 71-year-old woman with a past medical history of nephrolithiasis who presents with a complaint of diarrhea and a recent episode of diverticulitis. She states that she has had cramping, loose stools. She states that currently she has no loose stools, but has intestinal cramps with bloating. She states that before the diarrhea, she has some nausea and then pain with diarrhea. She states that in 2022, Gardnerella which was identified in urin PCR - although her urine culture was negative. She states she was treated with Flagyl and Levoquin. She felt that while she was taking the antibiotics, she felt the best she had ever felt more recently. Family history of colon cancer: No  Family history of IBD: Her brother has ulcerative colitis  Blood in stool: No  Unintentional weight loss: No  Abdominal pain: No - more like cramps  Prior colonoscopy:Yes - had one 11 years ago for LLQ cramps which were later identified as a misplaced contraceptive divide that perforated her fallopian tubes.    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    High risk HPV infection     Kidney stone     LSIL (low grade squamous intraepithelial lesion) on Pap smear 2012    LSIL (low grade squamous intraepithelial lesion) on Pap smear 2007    Pre-eclampsia      delivery      labor     PTSD (post-traumatic stress disorder)     Type O blood, Rh negative          Past Surgical History:   Procedure Laterality Date    APPENDECTOMY  2012    BARTHOLIN GLAND CYST EXCISION      [facility]  [facility]2012    d/t LSIL    COLPOSCOPY  08/30/2007    WNL    ENDOMETRIAL BIOPSY  03/15/2013    WNL    LAPAROSCOPY  06/15/2012    dx lap WNL    LEEP      LIPOSUCTION      OTHER SURGICAL HISTORY  04/24/2009    ESSURE    TONSILLECTOMY AND ADENOIDECTOMY      TUBOPLASTY / TUBOTUBAL ANASTOMOSIS           Current Outpatient Medications   Medication Sig Dispense Refill    NONFORMULARY daily Estrogen cream OTC      Hyoscyamine Sulfate SL (LEVSIN/SL) 0.125 MG SUBL Place 0.125 mg under the tongue every 4 hours as needed (abdominal pain) 42 each 0    ketorolac (TORADOL) 10 MG tablet Take 1 tablet by mouth every 6 hours as needed for Pain 20 tablet 0    ondansetron (ZOFRAN) 4 MG tablet Take 1 tablet by mouth every 8 hours as needed for Nausea or Vomiting 30 tablet 0    omeprazole (PRILOSEC) 40 MG delayed release capsule Take 1 capsule by mouth every morning (before breakfast) 90 capsule 1    ibuprofen (ADVIL;MOTRIN) 200 MG tablet Take 200 mg by mouth every 6 hours as needed for Pain      phenazopyridine (PYRIDIUM) 100 MG tablet Take 1 tablet by mouth 3 times daily as needed for Pain 30 tablet 0    progesterone (PROMETRIUM) 100 MG CAPS capsule Take 150 mg by mouth at bedtime      valACYclovir (VALTREX) 500 MG tablet Take 1 tablet by mouth daily (Patient taking differently: Take 500 mg by mouth daily as needed) 30 tablet 12     No current facility-administered medications for this visit.          Family History   Problem Relation Age of Onset    Diabetes Paternal Grandfather     Heart Disease Paternal Grandfather     Heart Disease Paternal Grandmother     Diabetes Maternal Grandmother     Ovarian Cancer Maternal Grandmother     Hypertension Maternal Grandfather     Diabetes Maternal Grandfather     Hypertension Father     Other Father         diverticulitis    Diabetes Mother     Other Brother         diverticulitis    Other Brother         ulcerative coloitis    No Known Problems Brother     No Known Problems Sister Ovarian Cancer Maternal Aunt           Social Determinants of Health     Tobacco Use: Medium Risk    Smoking Tobacco Use: Former    Smokeless Tobacco Use: Never    Passive Exposure: Not on file   Alcohol Use: Not At Risk    Frequency of Alcohol Consumption: Monthly or less    Average Number of Drinks: 1 or 2    Frequency of Binge Drinking: Never   Financial Resource Strain: Low Risk     Difficulty of Paying Living Expenses: Not hard at all   Food Insecurity: No Food Insecurity    Worried About Running Out of Food in the Last Year: Never true    Ran Out of Food in the Last Year: Never true   Transportation Needs: No Transportation Needs    Lack of Transportation (Medical): No    Lack of Transportation (Non-Medical): No   Physical Activity: Sufficiently Active    Days of Exercise per Week: 3 days    Minutes of Exercise per Session: 90 min   Stress: No Stress Concern Present    Feeling of Stress : Only a little   Social Connections: Moderately Isolated    Frequency of Communication with Friends and Family: More than three times a week    Frequency of Social Gatherings with Friends and Family: Three times a week    Attends Orthodoxy Services: More than 4 times per year    Active Member of Clubs or Organizations: No    Attends Club or Organization Meetings: Never    Marital Status:    Intimate Partner Violence: Not At Risk    Fear of Current or Ex-Partner: No    Emotionally Abused: No    Physically Abused: No    Sexually Abused: No   Depression: Not at risk    PHQ-2 Score: 0   Housing Stability: Low Risk     Unable to Pay for Housing in the Last Year: No    Number of Places Lived in the Last Year: 1    Unstable Housing in the Last Year: No   Postpartum Depression: Not on file       Review of Systems   Gastrointestinal:         Abdominal cramps   Endocrine:        Hormone imbalance   Genitourinary:         Kidney stones   Psychiatric/Behavioral:  The patient is nervous/anxious.          Depression   All other systems reviewed and are negative. /73 (Site: Right Upper Arm, Position: Sitting, Cuff Size: Medium Adult)   Pulse 85   Temp 97.1 °F (36.2 °C) (Temporal)   Resp 16   Ht 5' 5.5\" (1.664 m)   Wt 152 lb 1.6 oz (69 kg)   BMI 24.93 kg/m²     Physical Exam  Constitutional:       Appearance: Normal appearance. HENT:      Head: Normocephalic. Right Ear: External ear normal.      Left Ear: External ear normal.      Nose: Nose normal.      Mouth/Throat:      Mouth: Mucous membranes are moist.   Eyes:      Extraocular Movements: Extraocular movements intact. Pupils: Pupils are equal, round, and reactive to light. Cardiovascular:      Rate and Rhythm: Normal rate and regular rhythm. Pulses: Normal pulses. Pulmonary:      Effort: Pulmonary effort is normal.      Breath sounds: Normal breath sounds. Abdominal:      General: Bowel sounds are normal.      Palpations: Abdomen is soft. Musculoskeletal:         General: Normal range of motion. Cervical back: Normal range of motion. Skin:     General: Skin is warm. Neurological:      General: No focal deficit present. Mental Status: She is alert. Psychiatric:         Mood and Affect: Mood normal.         Lab Results   Component Value Date    WBC 9.1 07/27/2022    HGB 12.5 07/27/2022    HCT 37.6 07/27/2022    MCV 92.2 07/27/2022    PLT See Reflexed IPF Result 07/27/2022        Lab Results   Component Value Date     03/09/2022    K 4.0 03/09/2022     03/09/2022    CO2 29 03/09/2022    BUN 16 03/09/2022    CREATININE 0.71 03/09/2022    GLUCOSE 79 03/09/2022    CALCIUM 8.9 03/09/2022    PROT 6.6 03/09/2022    LABALBU 4.4 03/09/2022    BILITOT 0.8 03/09/2022    ALKPHOS 70 03/09/2022    AST 15 03/09/2022    ALT 11 03/09/2022    LABGLOM >60 07/02/2016    GFRAA >60 07/02/2016    GLOB NOT REPORTED 07/02/2016     CT of abdomen and pelvis without contrast - stone protocol 0//26/2022  1.   Bilateral punctate nonobstructing renal calculi again demonstrated. No   acute inflammatory process identified, within the limits of a noncontrast   exam.       2.  Diverticulosis. Assessment    Lola Hearn is a 28-year-old woman with a past medical history of nephrolithiasis, history of clinical diverticulitis, ulcerative colitis in her brother who presents with a complaint of diarrhea along with cramps which is concerning for being a part of the gut brain axis dysfunction. FODMAP information to be provided. It is reasonable to consider colonoscopy considering her family history of ulcerative colitis as well as her history of clinical diverticulitis. ASA 2, Mallampati score 1. Plan      1. Family history of chronic ulcerative colitis  -MiraLAX and Gatorade prep  - COLONOSCOPY W/ OR W/O BIOPSY; Future    2. History of diverticulitis  -MiraLAX and Gatorade prep  - COLONOSCOPY W/ OR W/O BIOPSY; Future    3. Loose stools  FODMAP intolerance was discussed with the provision of the FODMAP pamphlet. Details were provided on foods to avoid fr the next 6 weeks as well as foods that low or free of FODMAPs. Ideally, the dietary discretion is for 6 weeks   - COLONOSCOPY W/ OR W/O BIOPSY; Future    4. Bloating:  - H. Pylori Antigen, Stool; Future  - IgA; Future  - Endomysial Antibody, IgA; Future  - Tissue Transglutaminase, IgA; Future    5. Follow-up in 8 to 10 weeks    Informed consent was obtained with a discussion about potential risks and complications of the procedure. Patient verbalized understanding and willingness to continue with the procedure scheduling. Spent 20 minutes with the patient with greater than 50 percent of the time was spent on face-to-face time in discussion with the patient regarding diagnostic options/results, treatment options, counseling, and follow-up plan.       Palma Valenzuela MD

## 2023-01-27 NOTE — TELEPHONE ENCOUNTER
Patient is schedule  6/21/23 for colonoscopy prep instructions handed to patient form faxed to surgery

## 2023-02-03 ENCOUNTER — HOSPITAL ENCOUNTER (OUTPATIENT)
Age: 43
Discharge: HOME | End: 2023-02-03
Payer: COMMERCIAL

## 2023-02-03 DIAGNOSIS — Z87.19 HISTORY OF DIVERTICULITIS: ICD-10-CM

## 2023-02-03 DIAGNOSIS — R14.0 BLOATING: ICD-10-CM

## 2023-02-03 DIAGNOSIS — R19.5 LOOSE STOOLS: ICD-10-CM

## 2023-02-03 DIAGNOSIS — Z83.79 FAMILY HISTORY OF CHRONIC ULCERATIVE COLITIS: ICD-10-CM

## 2023-02-03 PROCEDURE — 87338 HPYLORI STOOL AG IA: CPT

## 2023-02-03 PROCEDURE — 82784 ASSAY IGA/IGD/IGG/IGM EACH: CPT

## 2023-02-03 PROCEDURE — 83516 IMMUNOASSAY NONANTIBODY: CPT

## 2023-02-03 PROCEDURE — 86256 FLUORESCENT ANTIBODY TITER: CPT

## 2023-02-03 PROCEDURE — 36415 COLL VENOUS BLD VENIPUNCTURE: CPT

## 2023-02-04 LAB
IGA SERPL-MCNC: 119 MG/DL (ref 70–400)
MICROORGANISM/AGENT SPEC: POSITIVE
SPECIMEN DESCRIPTION: ABNORMAL
TTG IGA SER IA-ACNC: <0.1 U/ML

## 2023-02-06 ENCOUNTER — PATIENT MESSAGE (OUTPATIENT)
Dept: GASTROENTEROLOGY | Age: 43
End: 2023-02-06

## 2023-02-07 DIAGNOSIS — Z86.19 HISTORY OF HELICOBACTER PYLORI INFECTION: Primary | ICD-10-CM

## 2023-02-07 DIAGNOSIS — A04.8 BACTERIAL INFECTION DUE TO H. PYLORI: Primary | ICD-10-CM

## 2023-02-07 RX ORDER — METRONIDAZOLE 500 MG/1
500 TABLET ORAL 4 TIMES DAILY
Qty: 40 TABLET | Refills: 0 | Status: SHIPPED | OUTPATIENT
Start: 2023-02-07 | End: 2023-02-17

## 2023-02-07 RX ORDER — TETRACYCLINE HYDROCHLORIDE 500 MG/1
500 CAPSULE ORAL 4 TIMES DAILY
Qty: 40 CAPSULE | Refills: 0 | Status: SHIPPED | OUTPATIENT
Start: 2023-02-07 | End: 2023-02-17

## 2023-02-07 NOTE — TELEPHONE ENCOUNTER
From: Tanika Coles  To: Dr. Indio Mcdonald: 2/6/2023 6:47 PM EST  Subject: H Pylori    Hello! I just wanted to let you know that I've been having a bit of issues again. Last night I had watery stool that I could not hold in and thank goodness i was home (embarrassingly enough). The watery stool was then followed by a small amount of formed stool. I've had the cramping discomfort off and on today with a lot of nausea. I'm not sure if it's related, but I constantly taste what I can only describe as butter in my mouth and have for a few days. No matter what I eat, it has a buttery taste and when I haven't eaten it tastes like I'm eating butter. I don't have covid and I haven't had covid in over a year. I never had it affect my taste when i did have it. Also, I just started the fodmap diet on Friday. I saw that the H pylori is positive. I know that Flagyl is sometimes used as treatment. I had Levaquin and Flagyl a few months back (ordered from that PCR urine by Dr Sirena Thorne) and felt a LOT better, but I shouldn't test positive now if that took care of it correct? I am wondering what our next step is? Thanks!

## 2023-02-08 ENCOUNTER — TELEPHONE (OUTPATIENT)
Dept: GASTROENTEROLOGY | Age: 43
End: 2023-02-08

## 2023-02-08 NOTE — TELEPHONE ENCOUNTER
We received a request for a PA for the tetracycline. I submitted PA via cover my meds. Await response. My chart note sent to [facility] to make her aware.

## 2023-02-08 NOTE — TELEPHONE ENCOUNTER
Ruben Warner called in and said that the pharmacy informed her that the Tetracycline needs a PA. I told her that I did send her a My Chart note. She asked about how long it takes. I told her that it has been completed and it said that it could take up to 5 days for a response. I told her we will call the pharmacy once it is approved. Voices understanding.

## 2023-02-16 NOTE — TELEPHONE ENCOUNTER
Cary Felton called in and asked if we have heard anything from insurance regarding the PA for the tetracycline. We have not so I called her insurance ([phone]) they said that it is still in medical review. They have had a high number of PA's since the first of the year and typical time frame for a decision is 7-10 business days. She recommends that we give it longer. I called and informed Cary Felton. Voices understanding.

## 2023-02-22 NOTE — TELEPHONE ENCOUNTER
I called Lola Zafar regarding the [employer] below. She said after about the first 3 doses of the medication she did not have any of the symptoms and is doing fine on the medication. Cordell GAVIN [facility] Clinical Staff (supporting Harriet Echavarria LPN) 2 days ago              Vanessa Pineda! I started the meds this a.m. The first dose I was good. 12pm and 4pm doses about 15 min or so after my tongue hurts, face and chest feel tight, and feel dizzy/nauseous. I'm obviously still alive, not anaphylaxis or anything-but my significant other wanted me to report this as he doesn't feel it's normal. I am working from home today and until I feel comfortable. I did have some questions if you can call me when you receive this? You  Cordell VAZQUEZ 2 weeks ago     Joey Wood the pharmacy notified us that your insurance requires a prior authorization on the Tetracycline. I have completed the prior authorization electronically. We will have to wait on a response from your insurance before it can be dispensed. It doesn't appear the other medications she prescribed need a PA. Don't start treatment until we find out if this can be approved. To treat H-Pylori you need to e treated with the combination of all these medications for 10 days.

## 2023-02-27 ENCOUNTER — TELEPHONE (OUTPATIENT)
Dept: GASTROENTEROLOGY | Age: 43
End: 2023-02-27

## 2023-02-27 DIAGNOSIS — R11.0 NAUSEA: ICD-10-CM

## 2023-02-27 DIAGNOSIS — K25.3 ACUTE GASTRIC ULCER DUE TO HELICOBACTER PYLORI: Primary | ICD-10-CM

## 2023-02-27 DIAGNOSIS — B96.81 ACUTE GASTRIC ULCER DUE TO HELICOBACTER PYLORI: Primary | ICD-10-CM

## 2023-02-27 RX ORDER — ONDANSETRON 4 MG/1
4 TABLET, FILM COATED ORAL EVERY 8 HOURS PRN
Qty: 20 TABLET | Refills: 0 | Status: SHIPPED | OUTPATIENT
Start: 2023-02-27 | End: 2023-03-04

## 2023-02-27 NOTE — TELEPHONE ENCOUNTER
Dr. Walker Suazo ordered Zofran PRN for the nausea. I called Buck Polanco and explained that Dr. Walker Suazo really wants her to try and complete the 10 days of treatment. She has sent  a prescription to the pharmacy for Zofran to take PRN. Voices understanding.

## 2023-02-27 NOTE — TELEPHONE ENCOUNTER
Jayjay Sands  to P [facility] Clinical Staff (supporting Rosita Martino LPN)      1:28 AM  Rosita,  I have been feeling increased nausea with taking the medication, as well as diarrhea. I have been dealing with the loose stools because I know it's an expected side effect of all of this antibiotic, but now with the nausea I can barely keep the medicine down. Is it normal for all of this to intensify the longer I take it? I'm really struggling at this point.

## 2023-03-06 ENCOUNTER — PATIENT MESSAGE (OUTPATIENT)
Dept: OBGYN | Age: 43
End: 2023-03-06

## 2023-03-06 DIAGNOSIS — N93.8 DUB (DYSFUNCTIONAL UTERINE BLEEDING): Primary | ICD-10-CM

## 2023-03-07 ENCOUNTER — HOSPITAL ENCOUNTER (OUTPATIENT)
Age: 43
Setting detail: SPECIMEN
Discharge: HOME | End: 2023-03-07

## 2023-03-07 DIAGNOSIS — N93.8 DUB (DYSFUNCTIONAL UTERINE BLEEDING): ICD-10-CM

## 2023-03-07 LAB — TESTOST SERPL-MCNC: 14 NG/DL (ref 20–70)

## 2023-03-08 LAB
ESTRADIOL LEVEL: 38.9 PG/ML (ref 27–314)
FOLLICLE STIMULATING HORMONE: 5.3 MIU/ML (ref 1.7–21.5)
LH: 20.5 MIU/ML (ref 1–95.6)
PROGEST SERPL-MCNC: 0.34 NG/ML
PROLACTIN: 8.12 NG/ML (ref 4.79–23.3)

## 2023-03-14 ENCOUNTER — TELEPHONE (OUTPATIENT)
Dept: GASTROENTEROLOGY | Age: 43
End: 2023-03-14

## 2023-03-14 NOTE — TELEPHONE ENCOUNTER
Contacted patient and moved colonoscopy to 4/25/23. Patient agreed that would work. Using Miralax/Gatorade prep - requesting copy to be emailed. - Email confirmed. Will repeat lab test later part of April. Changed office visit out one week to 5/1/23 to review labs, colonoscopy and sx.

## 2023-03-17 ENCOUNTER — TELEPHONE (OUTPATIENT)
Dept: GASTROENTEROLOGY | Age: 43
End: 2023-03-17

## 2023-03-17 ENCOUNTER — HOSPITAL ENCOUNTER (OUTPATIENT)
Age: 43
Setting detail: SPECIMEN
Discharge: HOME | End: 2023-03-17

## 2023-03-17 DIAGNOSIS — R53.83 FATIGUE, UNSPECIFIED TYPE: ICD-10-CM

## 2023-03-17 DIAGNOSIS — Z00.00 WELLNESS EXAMINATION: ICD-10-CM

## 2023-03-17 DIAGNOSIS — Z13.220 SCREENING FOR LIPID DISORDERS: ICD-10-CM

## 2023-03-17 DIAGNOSIS — Z13.1 ENCOUNTER FOR SCREENING FOR DIABETES MELLITUS: ICD-10-CM

## 2023-03-17 DIAGNOSIS — Z83.49 FAMILY HISTORY OF THYROID DISEASE: ICD-10-CM

## 2023-03-17 LAB
ABSOLUTE EOS #: 0.11 K/UL (ref 0–0.44)
ABSOLUTE IMMATURE GRANULOCYTE: 0.03 K/UL (ref 0–0.3)
ABSOLUTE LYMPH #: 3.13 K/UL (ref 1.1–3.7)
ABSOLUTE MONO #: 0.5 K/UL (ref 0.1–1.2)
ALBUMIN SERPL-MCNC: 5.1 G/DL (ref 3.5–5.2)
ALBUMIN/GLOBULIN RATIO: 1.7 (ref 1–2.5)
ALP SERPL-CCNC: 77 U/L (ref 35–104)
ALT SERPL-CCNC: 14 U/L (ref 5–33)
ANION GAP SERPL CALCULATED.3IONS-SCNC: 19 MMOL/L (ref 9–17)
AST SERPL-CCNC: 19 U/L
BASOPHILS # BLD: 1 % (ref 0–2)
BASOPHILS ABSOLUTE: 0.05 K/UL (ref 0–0.2)
BILIRUB SERPL-MCNC: 0.7 MG/DL (ref 0.3–1.2)
BUN SERPL-MCNC: 14 MG/DL (ref 6–20)
CALCIUM SERPL-MCNC: 9.9 MG/DL (ref 8.6–10.4)
CHLORIDE SERPL-SCNC: 101 MMOL/L (ref 98–107)
CHOLEST SERPL-MCNC: 248 MG/DL
CHOLESTEROL/HDL RATIO: 4.9
CO2 SERPL-SCNC: 20 MMOL/L (ref 20–31)
CREAT SERPL-MCNC: 0.77 MG/DL (ref 0.5–0.9)
EOSINOPHILS RELATIVE PERCENT: 1 % (ref 1–4)
GFR SERPL CREATININE-BSD FRML MDRD: >60 ML/MIN/1.73M2
GLUCOSE SERPL-MCNC: 85 MG/DL (ref 70–99)
HCT VFR BLD AUTO: 40.7 % (ref 36.3–47.1)
HDLC SERPL-MCNC: 51 MG/DL
HGB BLD-MCNC: 12.8 G/DL (ref 11.9–15.1)
IMMATURE GRANULOCYTES: 0 %
LDLC SERPL CALC-MCNC: 156 MG/DL (ref 0–130)
LYMPHOCYTES # BLD: 38 % (ref 24–43)
MCH RBC QN AUTO: 29 PG (ref 25.2–33.5)
MCHC RBC AUTO-ENTMCNC: 31.4 G/DL (ref 28.4–34.8)
MCV RBC AUTO: 92.3 FL (ref 82.6–102.9)
MONOCYTES # BLD: 6 % (ref 3–12)
NRBC AUTOMATED: 0 PER 100 WBC
PDW BLD-RTO: 12.6 % (ref 11.8–14.4)
PLATELET # BLD AUTO: 326 K/UL (ref 138–453)
PMV BLD AUTO: 10.2 FL (ref 8.1–13.5)
POTASSIUM SERPL-SCNC: 4.4 MMOL/L (ref 3.7–5.3)
PROT SERPL-MCNC: 8.1 G/DL (ref 6.4–8.3)
RBC # BLD: 4.41 M/UL (ref 3.95–5.11)
SEG NEUTROPHILS: 54 % (ref 36–65)
SEGMENTED NEUTROPHILS ABSOLUTE COUNT: 4.35 K/UL (ref 1.5–8.1)
SODIUM SERPL-SCNC: 140 MMOL/L (ref 135–144)
TRIGL SERPL-MCNC: 207 MG/DL
TSH SERPL-ACNC: 1.17 UIU/ML (ref 0.3–5)
WBC # BLD AUTO: 8.2 K/UL (ref 3.5–11.3)

## 2023-03-17 NOTE — TELEPHONE ENCOUNTER
Please see MY CHART message from 3/8/23. Patient is indicating having severe abdominal cramping and diarrhea , also indicating belching and large amounts of gas. Diet is very light and resting her stomach. Indicates taking her RX as directed. She indicates she will call her PCP for Levsin RX and how often she can take it. Also advised to go to the ER if her symptoms are severe enough. Patient verbalized understanding.

## 2023-03-21 NOTE — PROGRESS NOTES
Patient states they received their colon prep instructions and home medications that are to be taken on the day of their procedure with a small sip of water only, from the physician's office. Instructed pt to stop taking toradol and ibuprofen 7 days prior to surgery.

## 2023-03-28 ENCOUNTER — ANESTHESIA EVENT (OUTPATIENT)
Dept: OPERATING ROOM | Age: 43
End: 2023-03-28
Payer: COMMERCIAL

## 2023-03-29 ENCOUNTER — TELEPHONE (OUTPATIENT)
Dept: ANESTHESIOLOGY | Age: 43
End: 2023-03-29

## 2023-03-29 ENCOUNTER — HOSPITAL ENCOUNTER (OUTPATIENT)
Age: 43
Setting detail: OUTPATIENT SURGERY
Discharge: HOME | End: 2023-03-29
Attending: INTERNAL MEDICINE | Admitting: INTERNAL MEDICINE
Payer: COMMERCIAL

## 2023-03-29 ENCOUNTER — ANESTHESIA (OUTPATIENT)
Dept: OPERATING ROOM | Age: 43
End: 2023-03-29
Payer: COMMERCIAL

## 2023-03-29 VITALS
OXYGEN SATURATION: 98 % | DIASTOLIC BLOOD PRESSURE: 50 MMHG | BODY MASS INDEX: 23.14 KG/M2 | SYSTOLIC BLOOD PRESSURE: 109 MMHG | RESPIRATION RATE: 18 BRPM | HEART RATE: 77 BPM | TEMPERATURE: 97.7 F | HEIGHT: 66 IN | WEIGHT: 144 LBS

## 2023-03-29 DIAGNOSIS — Z83.79 FAMILY HISTORY OF CHRONIC ULCERATIVE COLITIS: ICD-10-CM

## 2023-03-29 PROCEDURE — 6360000002 HC RX W HCPCS: Performed by: NURSE ANESTHETIST, CERTIFIED REGISTERED

## 2023-03-29 PROCEDURE — 88305 TISSUE EXAM BY PATHOLOGIST: CPT

## 2023-03-29 PROCEDURE — 7100000010 HC PHASE II RECOVERY - FIRST 15 MIN: Performed by: INTERNAL MEDICINE

## 2023-03-29 PROCEDURE — 7100000011 HC PHASE II RECOVERY - ADDTL 15 MIN: Performed by: INTERNAL MEDICINE

## 2023-03-29 PROCEDURE — 3700000000 HC ANESTHESIA ATTENDED CARE: Performed by: INTERNAL MEDICINE

## 2023-03-29 PROCEDURE — 45380 COLONOSCOPY AND BIOPSY: CPT | Performed by: INTERNAL MEDICINE

## 2023-03-29 PROCEDURE — 2500000003 HC RX 250 WO HCPCS: Performed by: NURSE ANESTHETIST, CERTIFIED REGISTERED

## 2023-03-29 PROCEDURE — 3700000001 HC ADD 15 MINUTES (ANESTHESIA): Performed by: INTERNAL MEDICINE

## 2023-03-29 PROCEDURE — 3609010600 HC COLONOSCOPY POLYPECTOMY SNARE/COLD BIOPSY: Performed by: INTERNAL MEDICINE

## 2023-03-29 PROCEDURE — 2580000003 HC RX 258: Performed by: NURSE ANESTHETIST, CERTIFIED REGISTERED

## 2023-03-29 PROCEDURE — 2709999900 HC NON-CHARGEABLE SUPPLY: Performed by: INTERNAL MEDICINE

## 2023-03-29 RX ORDER — LIDOCAINE HYDROCHLORIDE 20 MG/ML
INJECTION, SOLUTION EPIDURAL; INFILTRATION; INTRACAUDAL; PERINEURAL PRN
Status: DISCONTINUED | OUTPATIENT
Start: 2023-03-29 | End: 2023-03-29 | Stop reason: SDUPTHER

## 2023-03-29 RX ORDER — PROPOFOL 10 MG/ML
INJECTION, EMULSION INTRAVENOUS PRN
Status: DISCONTINUED | OUTPATIENT
Start: 2023-03-29 | End: 2023-03-29 | Stop reason: SDUPTHER

## 2023-03-29 RX ORDER — SODIUM CHLORIDE, SODIUM LACTATE, POTASSIUM CHLORIDE, CALCIUM CHLORIDE 600; 310; 30; 20 MG/100ML; MG/100ML; MG/100ML; MG/100ML
INJECTION, SOLUTION INTRAVENOUS CONTINUOUS
Status: DISCONTINUED | OUTPATIENT
Start: 2023-03-29 | End: 2023-03-29 | Stop reason: HOSPADM

## 2023-03-29 RX ADMIN — PROPOFOL 180 MCG/KG/MIN: 10 INJECTION, EMULSION INTRAVENOUS at 14:55

## 2023-03-29 RX ADMIN — SODIUM CHLORIDE, POTASSIUM CHLORIDE, SODIUM LACTATE AND CALCIUM CHLORIDE: 600; 310; 30; 20 INJECTION, SOLUTION INTRAVENOUS at 14:15

## 2023-03-29 RX ADMIN — LIDOCAINE HYDROCHLORIDE 80 MG: 20 INJECTION, SOLUTION EPIDURAL; INFILTRATION; INTRACAUDAL; PERINEURAL at 14:54

## 2023-03-29 RX ADMIN — PROPOFOL 200 MCG/KG/MIN: 10 INJECTION, EMULSION INTRAVENOUS at 14:57

## 2023-03-29 RX ADMIN — PROPOFOL 40 MG: 10 INJECTION, EMULSION INTRAVENOUS at 14:56

## 2023-03-29 RX ADMIN — PROPOFOL 40 MG: 10 INJECTION, EMULSION INTRAVENOUS at 14:58

## 2023-03-29 RX ADMIN — PROPOFOL 90 MG: 10 INJECTION, EMULSION INTRAVENOUS at 14:54

## 2023-03-29 ASSESSMENT — LIFESTYLE VARIABLES: SMOKING_STATUS: 0

## 2023-03-29 ASSESSMENT — PAIN - FUNCTIONAL ASSESSMENT: PAIN_FUNCTIONAL_ASSESSMENT: NONE - DENIES PAIN

## 2023-03-29 NOTE — DISCHARGE INSTRUCTIONS
SAME DAY SURGERY DISCHARGE INSTRUCTIONS    1. Do not drive or operate hazardous machinery for 24 hours. 2.  Do not make important personal or business decisions for 24 hours. 3.  Do not drink alcoholic beverages for 24 hours. 4.  Do not smoke tobacco products for 24 hours. 5.  Eat light foods (Jell-O, soups, etc....) and drink plenty of fluids (water, Sprite, etc...) up to 8 glasses per day, as you can tolerate. 6.  Limit your activities for 24 hours. Do not engage in heavy work until your surgeon gives you permission. 7.  Call your surgeon for any questions regarding your surgery. 8.  Patient should not be left alone for 12-24 hours following surgical procedure. COLONOSCOPY DISCHARGE INSTRUCTIONS:    It's normal to have a feeling of fullness or mild cramping in your abdomen afterwards due to air that is put into your bowel during the procedure. Mild activities such as walking will help you pass the air. You may resume your regular diet. You will receive a letter or phone call with your test results in 2 weeks. If you have not received a letter or a phone call in 2 weeks please call the office for your results. CALL THE DOCTOR IF YOU HAVE:    Chest pain or trouble breathing. A hoarse voice or trouble swallowing    Bleeding, vomiting or spitting up of blood that is more than a few streaks or red or black stools    A fever above 101F or if you have chills    Pain that is worse or different than any pain you had before the procedure    Nausea or vomiting that lasts for more than 2 hours. If symptoms are to severe call 911 or go to the nearest Emergency Room.

## 2023-03-29 NOTE — ANESTHESIA PRE PROCEDURE
Department of Anesthesiology  Preprocedure Note       Name:  Abhijit Saldivar   Age:  40 y.o.  :  1980                                          MRN:  [id number]         Date:  3/29/2023      Surgeon: Jessie Mohs):  Corrinne Distel, MD    Procedure: Procedure(s):  COLORECTAL CANCER SCREENING, HIGH RISK    Medications prior to admission:   Prior to Admission medications    Medication Sig Start Date End Date Taking?  Authorizing Provider   NONFORMULARY daily Estrogen cream OTC    Historical Provider, MD   Hyoscyamine Sulfate SL (LEVSIN/SL) 0.125 MG SUBL Place 0.125 mg under the tongue every 4 hours as needed (abdominal pain) 22  Kassi Fierro APRN - CNP   ketorolac (TORADOL) 10 MG tablet Take 1 tablet by mouth every 6 hours as needed for Pain 22  Eron Sebastian APRN - CNP   ondansetron (ZOFRAN) 4 MG tablet Take 1 tablet by mouth every 8 hours as needed for Nausea or Vomiting 22   Nelma Class, APRN - CNP   omeprazole (PRILOSEC) 40 MG delayed release capsule Take 1 capsule by mouth every morning (before breakfast) 22   Nelma Class, APRN - CNP   ibuprofen (ADVIL;MOTRIN) 200 MG tablet Take 200 mg by mouth every 6 hours as needed for Pain    Historical Provider, MD   phenazopyridine (PYRIDIUM) 100 MG tablet Take 1 tablet by mouth 3 times daily as needed for Pain 22  Blair Belle MD   progesterone (PROMETRIUM) 100 MG CAPS capsule Take 150 mg by mouth at bedtime    Historical Provider, MD   valACYclovir (VALTREX) 500 MG tablet Take 1 tablet by mouth daily  Patient taking differently: Take 500 mg by mouth daily as needed 22   Rosario Brown MD       Current medications:    Current Facility-Administered Medications   Medication Dose Route Frequency Provider Last Rate Last Admin    lactated ringers IV soln infusion   IntraVENous Continuous JeniseCHIKA Messina CRNA 100 mL/hr at 23 1415 New Bag at 23 1415       Allergies:

## 2023-03-29 NOTE — ANESTHESIA POSTPROCEDURE EVALUATION
Department of Anesthesiology  Postprocedure Note    Patient: Bobbi Doe  MRN: [id number]  YOB: 1980  Date of evaluation: 3/29/2023      Procedure Summary     Date: 03/29/23 Room / Location: [address] [place] [address]    Anesthesia Start: 1450 Anesthesia Stop: 1518    Procedure: COLONOSCOPY POLYPECTOMY SNARE/COLD BIOPSY Diagnosis:       Family history of chronic ulcerative colitis      (FAMILY HISTORY CHRONIC ULCERATIVE COLITIS)    Surgeons: Rebeka Figueroa MD Responsible Provider: CHIKA Soares CRNA    Anesthesia Type: general, TIVA ASA Status: 1          Anesthesia Type: No value filed.     Adriana Phase I: Adriana Score: 10    Adriana Phase II: Adriana Score: 10      Anesthesia Post Evaluation    Patient location during evaluation: PACU  Patient participation: complete - patient participated  Level of consciousness: awake and alert  Pain score: 0  Airway patency: patent  Nausea & Vomiting: no nausea and no vomiting  Complications: no  Cardiovascular status: blood pressure returned to baseline  Respiratory status: acceptable and room air  Hydration status: stable

## 2023-03-29 NOTE — OP NOTE
[facility]    COLONOSCOPY    PROCEDURE DATE: 03/29/23    REFERRING PHYSICIAN: No ref. provider found     PRIMARY CARE PROVIDER: CHIKA Harper - CNP    ATTENDING PHYSICIAN: Lauro Swift MD     HISTORY: Ms. Abhijit Saldivar is a 40 y.o. female who presents to the  endoscopy unit for colonoscopy. The patient's clinical history is remarkable for irritable bowel syndrome. She is currently medically stable and appropriate for the planned procedure. PREOPERATIVE DIAGNOSIS: Alternating bowel movement pattern - diarrhea and constipation. Question of prior diverticulitis    PROCEDURES:   Transanal Colonoscopy --diagnostic. POSTPROCEDURE DIAGNOSIS:  Tortuous colon    MEDICATIONS: MAC per anesthesia     EBL 2 ml      INSTRUMENT: Olympus CF-H190 AL Pediatric flexible Colonoscope. PREPARATION: The nature and character of the procedure as well as risks, benefits, and alternatives were discussed with the patient and informed consent was obtained. Complications were said to include, but were not limited to: medication allergy, medication reaction, cardiovascular and respiratory problems, bleeding, perforation, infection, and/or missed diagnosis. Following arrival in the endoscopy room, the patient was placed in the left lateral decubitus position and final time-out accomplished in the presence of the nursing staff. Baseline vital signs were obtained and reviewed, and IV sedation was subsequently initiated. FINDINGS: Rectal examination demonstrated no significant visible external abnormality and digital palpation was unremarkable. Following adequate conscious sedation the colonoscope was introduced and advanced under direct visualization to the terminal ileum. The bowel preparation was felt to be fair - improved with scope irrigation. Cecal intubation time was 5 minutes. Once maximally inserted, the endoscope was withdrawn and the mucosa was carefully inspected.  The mucosal exam revealed normal

## 2023-04-01 LAB — SURGICAL PATHOLOGY REPORT: NORMAL

## 2023-05-01 ENCOUNTER — OFFICE VISIT (OUTPATIENT)
Dept: GASTROENTEROLOGY | Age: 43
End: 2023-05-01
Payer: COMMERCIAL

## 2023-05-01 VITALS
BODY MASS INDEX: 23.38 KG/M2 | RESPIRATION RATE: 18 BRPM | TEMPERATURE: 98.6 F | DIASTOLIC BLOOD PRESSURE: 89 MMHG | SYSTOLIC BLOOD PRESSURE: 129 MMHG | HEART RATE: 85 BPM | WEIGHT: 145.5 LBS | HEIGHT: 66 IN

## 2023-05-01 DIAGNOSIS — A04.8 BACTERIAL INFECTION DUE TO H. PYLORI: Primary | ICD-10-CM

## 2023-05-01 PROCEDURE — 99212 OFFICE O/P EST SF 10 MIN: CPT | Performed by: INTERNAL MEDICINE

## 2023-05-01 NOTE — PROGRESS NOTES
[facility]  [address]  [address]  Dept: [phone]    Chief Complaint   Patient presents with    Follow-up     Follow IBS-had colonoscopy-pt was positive for H-Pylori-treated-feels great. ZANDER     Lola CLARKE [facility] [place] is a 45-year-old woman with a past medical history of nephrolithiasis who presents with a complaint of diarrhea and a recent episode of diverticulitis. She states that she has had cramping, loose stools. She states that currently she has no loose stools, but has intestinal cramps with bloating. She states that before the diarrhea, she has some nausea and then pain with diarrhea. She states that in 2022, Gardnerella which was identified in urin PCR - although her urine culture was negative. She states she was treated with Flagyl and Levoquin. She felt that while she was taking the antibiotics, she felt the best she had ever felt more recently. Family history of colon cancer: No  Family history of IBD: Her brother has ulcerative colitis  Blood in stool: No  Unintentional weight loss: No  Abdominal pain: No - more like cramps  Prior colonoscopy:Yes - had one 11 years ago for LLQ cramps which were later identified as a misplaced contraceptive divide that perforated her fallopian tubes. Change in stool caliber: No    Interval history 2023  She said she has no more pain since the antibiotic regimen was completed. She also states that she is trying to avoid red meat and eat more vegetables.        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

## 2023-05-02 NOTE — RESULT ENCOUNTER NOTE
Vm from pt returning my call. Call back to pt advised   Future Appointments   Date Time Provider Hima Paz   5/24/2023  9:30 AM Iftikhar Crawford MD  [facility]     To get x-rays to submit to ins for approval of Gel injections. lvm

## 2023-06-08 ENCOUNTER — TELEPHONE (OUTPATIENT)
Dept: OBGYN | Age: 43
End: 2023-06-08

## 2023-06-08 NOTE — TELEPHONE ENCOUNTER
Pt called into lvm stating she had a puddle, very large amount of clear fluid come out of her vagina she is 100% sure it was not urine, her cycles are regular so no pregnancy chance per pt, no openings today pt would like to know if we have any advise.

## 2023-07-10 NOTE — H&P
03/15/2013    WNL    LAPAROSCOPY  06/15/2012    dx lap WNL    LEEP      LIPOSUCTION      OTHER SURGICAL HISTORY  2009    ESSURE    TONSILLECTOMY AND ADENOIDECTOMY      TUBOPLASTY / TUBOTUBAL ANASTOMOSIS       Current Facility-Administered Medications   Medication Dose Route Frequency Provider Last Rate Last Admin    lactated ringers IV soln infusion   IntraVENous Continuous CHIKA Espitia - CRNA 100 mL/hr at 23 1415 New Bag at 23 1415     Allergies   Allergen Reactions    Cephalexin Other (See Comments)    Morphine Other (See Comments)     headaches    Pcn [Penicillins] Hives    Codeine Nausea And Vomiting    Nickel Rash     Family History   Problem Relation Age of Onset    Diabetes Paternal Grandfather     Heart Disease Paternal Grandfather     Heart Disease Paternal Grandmother     Diabetes Maternal Grandmother     Ovarian Cancer Maternal Grandmother     Hypertension Maternal Grandfather     Diabetes Maternal Grandfather     Hypertension Father     Other Father         diverticulitis    Diabetes Mother     Other Brother         diverticulitis    Other Brother         ulcerative coloitis    No Known Problems Brother     No Known Problems Sister     Ovarian Cancer Maternal Aunt      Social History     Socioeconomic History    Marital status: Single     Spouse name: Not on file    Number of children: Not on file    Years of education: Not on file    Highest education level: Not on file   Occupational History    Not on file   Tobacco Use    Smoking status: Former     Packs/day: 0.25     Types: Cigarettes     Quit date: 2016     Years since quittin.3    Smokeless tobacco: Never   Vaping Use    Vaping Use: Never used   Substance and Sexual Activity    Alcohol use: Not Currently     Comment: rarely    Drug use: No    Sexual activity: Yes     Partners: Male     Birth control/protection: Condom   Other Topics Concern    Not on file   Social History Narrative    Not on file     Social Determinants of Health     Financial Resource Strain: Low Risk     Difficulty of Paying Living Expenses: Not hard at all   Food Insecurity: No Food Insecurity    Worried About [employer] in the Last Year: Never true    Ran Out of Food in the Last Year: Never true   Transportation Needs: Unknown    Lack of Transportation (Medical): Not on file    Lack of Transportation (Non-Medical): No   Physical Activity: Not on file   Stress: Not on file   Social Connections: Not on file   Intimate Partner Violence: Not on file   Housing Stability: Unknown    Unable to Pay for Housing in the Last Year: Not on file    Number of Places Lived in the Last Year: Not on file    Unstable Housing in the Last Year: No     ROS: Non-contributory    Physical Exam:  Vitals:    03/29/23 1357   BP: (!) 151/84   Pulse: 79   Resp: 11   Temp: 97.5 °F (36.4 °C)   SpO2: 98%       Chest: Breath sounds were clear and equal with no rales, wheezes, or rhonchi. Respiratory effort was normal with no retractions or use of accessory muscles. Cardiovascular: Heart sounds were normal with a regular rate and rhythm. There were no murmurs, gallops or rubs. Abdomen: Bowel sounds were normal.  The abdomen was soft and non distended. There was no tenderness, guarding, rebound, or rigidity. There were no masses, hepatosplenomegaly, or hernias. Assessment    Lola Estrada is a 61-year-old woman with a past medical history of nephrolithiasis, history of clinical diverticulitis, ulcerative colitis in her brother who presents with a complaint of diarrhea along with cramps which is concerning for being a part of the gut brain axis dysfunction. FODMAP information to be provided. It is reasonable to consider colonoscopy considering her family history of ulcerative colitis as well as her history of clinical diverticulitis. ASA 2, Mallampati score 1. Plan       1.  Family history of chronic ulcerative colitis  -MiraLAX and Gatorade prep  - MODERATE

## 2023-08-31 ENCOUNTER — OFFICE VISIT (OUTPATIENT)
Dept: OBGYN | Age: 43
End: 2023-08-31
Payer: COMMERCIAL

## 2023-08-31 VITALS
HEIGHT: 66 IN | BODY MASS INDEX: 24.11 KG/M2 | SYSTOLIC BLOOD PRESSURE: 124 MMHG | DIASTOLIC BLOOD PRESSURE: 74 MMHG | WEIGHT: 150 LBS

## 2023-08-31 DIAGNOSIS — T19.2XXA FOREIGN BODY IN VAGINA, INITIAL ENCOUNTER: Primary | ICD-10-CM

## 2023-08-31 PROBLEM — M25.562 PAIN IN LEFT KNEE: Status: ACTIVE | Noted: 2023-08-31

## 2023-08-31 PROCEDURE — 99213 OFFICE O/P EST LOW 20 MIN: CPT | Performed by: ADVANCED PRACTICE MIDWIFE

## 2023-09-07 ENCOUNTER — TELEPHONE (OUTPATIENT)
Dept: UROLOGY | Age: 43
End: 2023-09-07

## 2023-09-07 DIAGNOSIS — R31.0 GROSS HEMATURIA: Primary | ICD-10-CM

## 2023-09-07 NOTE — TELEPHONE ENCOUNTER
Informed patient of the response and scheduled her for 10/3/2023. She will do the urine in the morning.

## 2023-09-07 NOTE — TELEPHONE ENCOUNTER
Drop off UA C&S at the lab    Schedule patient an appointment in the office in the next 2-6 weeks to discuss blood in the urine/ urethra

## 2023-09-07 NOTE — TELEPHONE ENCOUNTER
Patient has bleeding from the urethra but no urinary symptoms. She said in the past she has had to have  a PCR urine.

## 2023-09-11 ENCOUNTER — TELEPHONE (OUTPATIENT)
Dept: UROLOGY | Age: 43
End: 2023-09-11

## 2023-09-11 ENCOUNTER — HOSPITAL ENCOUNTER (OUTPATIENT)
Age: 43
Discharge: HOME | End: 2023-09-11
Payer: COMMERCIAL

## 2023-09-11 DIAGNOSIS — R31.0 GROSS HEMATURIA: ICD-10-CM

## 2023-09-11 LAB
BACTERIA URNS QL MICRO: ABNORMAL
BILIRUB UR QL STRIP: NEGATIVE
CLARITY UR: ABNORMAL
COLOR UR: YELLOW
EPI CELLS #/AREA URNS HPF: ABNORMAL /HPF (ref 0–25)
GLUCOSE UR STRIP-MCNC: NEGATIVE MG/DL
HGB UR QL STRIP.AUTO: ABNORMAL
KETONES UR STRIP-MCNC: NEGATIVE MG/DL
LEUKOCYTE ESTERASE UR QL STRIP: ABNORMAL
MUCOUS THREADS URNS QL MICRO: ABNORMAL
NITRITE UR QL STRIP: POSITIVE
PH UR STRIP: 6 [PH] (ref 5–9)
PROT UR STRIP-MCNC: NEGATIVE MG/DL
RBC #/AREA URNS HPF: ABNORMAL /HPF (ref 0–2)
SP GR UR STRIP: 1.01 (ref 1.01–1.02)
UROBILINOGEN UR STRIP-ACNC: NORMAL EU/DL (ref 0–1)
WBC #/AREA URNS HPF: ABNORMAL /HPF (ref 0–5)

## 2023-09-11 PROCEDURE — 81001 URINALYSIS AUTO W/SCOPE: CPT

## 2023-09-11 PROCEDURE — 87086 URINE CULTURE/COLONY COUNT: CPT

## 2023-09-11 NOTE — TELEPHONE ENCOUNTER
----- Message from Sergey Knutson sent at 9/11/2023  9:18 AM EDT -----  Regarding: Symptoms  Contact: [phone]  Hello! I called last week regarding blood tinged discharge from the urethra and was asymptomatic at the time. A PCR urine was ordered and I could not make it to the lab until this a.m. This weekend I started having lower-mid abdominal pain, somewhat like cramping (but I'm mid cycle of my period so shouldn't be that) and lower back pain. I also had occasional discomfort at the urethra. I just wanted to give that update.

## 2023-09-12 ENCOUNTER — TELEPHONE (OUTPATIENT)
Dept: UROLOGY | Age: 43
End: 2023-09-12

## 2023-09-12 LAB
MICROORGANISM SPEC CULT: NORMAL
SPECIMEN DESCRIPTION: NORMAL

## 2023-09-12 NOTE — TELEPHONE ENCOUNTER
----- Message from Felicitas Buerger, APRN - CNP sent at 9/12/2023 12:18 PM EDT -----  Call pt - urine cx reviewed and negative for UTI

## 2023-09-13 ENCOUNTER — HOSPITAL ENCOUNTER (OUTPATIENT)
Dept: CT IMAGING | Age: 43
Discharge: HOME | End: 2023-09-15
Payer: COMMERCIAL

## 2023-09-13 ENCOUNTER — TELEPHONE (OUTPATIENT)
Dept: UROLOGY | Age: 43
End: 2023-09-13

## 2023-09-13 DIAGNOSIS — R30.0 DYSURIA: ICD-10-CM

## 2023-09-13 DIAGNOSIS — R39.89 URETHRAL PAIN: ICD-10-CM

## 2023-09-13 DIAGNOSIS — N23 RENAL COLIC: ICD-10-CM

## 2023-09-13 DIAGNOSIS — R31.0 GROSS HEMATURIA: ICD-10-CM

## 2023-09-13 DIAGNOSIS — R31.0 GROSS HEMATURIA: Primary | ICD-10-CM

## 2023-09-13 PROCEDURE — 74176 CT ABD & PELVIS W/O CONTRAST: CPT

## 2023-09-13 NOTE — TELEPHONE ENCOUNTER
----- Message from Hossein Anson sent at 9/12/2023  8:41 PM EDT -----  Regarding: Questions  Contact: [phone]  I was wondering if there is still a possibility of infection (the results of the UA showing Hgb, Nitrites, Bacteria, etc.) even though there is no growth on the culture? I'm still having pain at the urethra, urgency, dribbling at times (I find I can't hold it even when it isn't a large amount), and some back discomfort. I know the PCR urine was done in the past for this reason of having symptoms and \"no infection\". I thought that because I had to do this at the lab that a a PCR test was ordered, but then saw just UA & Culture results. I will be at work, so it is fine to reply via message or leave a voicemail. Thanks!

## 2023-09-14 ENCOUNTER — TELEPHONE (OUTPATIENT)
Dept: UROLOGY | Age: 43
End: 2023-09-14

## 2023-09-14 ENCOUNTER — TELEPHONE (OUTPATIENT)
Dept: OBGYN | Age: 43
End: 2023-09-14

## 2023-09-14 DIAGNOSIS — N94.9 ADNEXAL CYST: Primary | ICD-10-CM

## 2023-09-14 NOTE — TELEPHONE ENCOUNTER
----- Message from CHIKA Hendrickson CNP sent at 9/14/2023  8:16 AM EDT -----  She was to be seen yesterday in clinic, not sure why this wasn't scheduled    CT does not show any ureteral stones.  Needs cysto

## 2023-09-19 ENCOUNTER — HOSPITAL ENCOUNTER (OUTPATIENT)
Age: 43
Setting detail: SPECIMEN
Discharge: HOME | End: 2023-09-19
Payer: COMMERCIAL

## 2023-09-19 ENCOUNTER — OFFICE VISIT (OUTPATIENT)
Dept: OBGYN | Age: 43
End: 2023-09-19
Payer: COMMERCIAL

## 2023-09-19 ENCOUNTER — ANCILLARY PROCEDURE (OUTPATIENT)
Dept: OBGYN | Age: 43
End: 2023-09-19
Payer: COMMERCIAL

## 2023-09-19 VITALS
HEIGHT: 66 IN | BODY MASS INDEX: 24.11 KG/M2 | WEIGHT: 150 LBS | SYSTOLIC BLOOD PRESSURE: 136 MMHG | DIASTOLIC BLOOD PRESSURE: 84 MMHG

## 2023-09-19 DIAGNOSIS — N83.291 COMPLEX CYST OF RIGHT OVARY: Primary | ICD-10-CM

## 2023-09-19 DIAGNOSIS — N83.201 CYST OF RIGHT OVARY: ICD-10-CM

## 2023-09-19 DIAGNOSIS — N83.291 COMPLEX CYST OF RIGHT OVARY: ICD-10-CM

## 2023-09-19 LAB
CANCER AG125 SERPL-ACNC: 24 U/ML
CEA SERPL-MCNC: 0.8 NG/ML

## 2023-09-19 PROCEDURE — 36415 COLL VENOUS BLD VENIPUNCTURE: CPT | Performed by: ADVANCED PRACTICE MIDWIFE

## 2023-09-19 PROCEDURE — 82378 CARCINOEMBRYONIC ANTIGEN: CPT

## 2023-09-19 PROCEDURE — 86304 IMMUNOASSAY TUMOR CA 125: CPT

## 2023-09-19 PROCEDURE — 99213 OFFICE O/P EST LOW 20 MIN: CPT | Performed by: ADVANCED PRACTICE MIDWIFE

## 2023-09-19 NOTE — PROGRESS NOTES
PROBLEM VISIT     Date of service: 2023    Rashi Ha  Is a 44 y.o. , female    PT's PCP is: CHIKA Barksdale CNP     : 1980                                             Subjective:       Patient's last menstrual period was 2023 (exact date).      OB History    Para Term  AB Living   7 4 3 1 3 4   SAB IAB Ectopic Molar Multiple Live Births   3         4      # Outcome Date GA Lbr Melchor/2nd Weight Sex Delivery Anes PTL Lv   7 SAB 22           6 SAB 22 3w0d    SAB   ND   5 Term 2009 38w0d 10:00 8 lb 3 oz (3.714 kg) M Vag-Spont None N RIO   4  2007 35w0d 02:00 6 lb 7 oz (2.92 kg) F Vag-Spont None Y RIO      Birth Comments:  labor at 20FVR      Complications:  Labor   3 Term 2002 37w0d 05:00 6 lb 7 oz (2.92 kg) F Vag-Spont None N RIO      Birth Comments: GDM   2 SAB 2001 13w0d    SAB   ND   1 Term 1999 41w0d 07:00 7 lb 6 oz (3.345 kg) F Vag-Spont None N RIO      Birth Comments: pre-e        Social History     Tobacco Use   Smoking Status Former    Packs/day: .25    Types: Cigarettes    Quit date: 2016    Years since quittin.8   Smokeless Tobacco Never        Social History     Substance and Sexual Activity   Alcohol Use Yes    Comment: rarely       Social History     Substance and Sexual Activity   Sexual Activity Yes    Partners: Male    Birth control/protection: Condom    Comment: condoms       Allergies: Cephalexin, Morphine, Pcn [penicillins], Codeine, and Nickel    Chief Complaint   Patient presents with    Cyst     F/U in house u/s for adnexal cyst. Pt states no issues or concerns        Last Yearly date:      Last pap date and results: 21    Last HPV date and results: 19 neg     Have you had a positive covid test: Yes    Have you had the covid immunization: Yes    PE:  Vital Signs  Blood pressure 136/84, height 5' 6\" (1.676 m), weight 150 lb (68 kg), last menstrual period 2023, not

## 2023-10-03 ENCOUNTER — OFFICE VISIT (OUTPATIENT)
Dept: UROLOGY | Age: 43
End: 2023-10-03
Payer: COMMERCIAL

## 2023-10-03 VITALS
DIASTOLIC BLOOD PRESSURE: 86 MMHG | HEIGHT: 66 IN | TEMPERATURE: 97.8 F | SYSTOLIC BLOOD PRESSURE: 122 MMHG | HEART RATE: 72 BPM | WEIGHT: 151 LBS | BODY MASS INDEX: 24.27 KG/M2

## 2023-10-03 DIAGNOSIS — N20.0 RENAL STONE: ICD-10-CM

## 2023-10-03 DIAGNOSIS — R31.0 GROSS HEMATURIA: Primary | ICD-10-CM

## 2023-10-03 PROCEDURE — 99213 OFFICE O/P EST LOW 20 MIN: CPT | Performed by: PHYSICIAN ASSISTANT

## 2023-10-03 ASSESSMENT — ENCOUNTER SYMPTOMS
BACK PAIN: 0
SHORTNESS OF BREATH: 0
VOMITING: 0
COUGH: 0
ABDOMINAL PAIN: 0
APNEA: 0
COLOR CHANGE: 0
NAUSEA: 0
CONSTIPATION: 0
EYE REDNESS: 0
WHEEZING: 0

## 2023-10-03 NOTE — PATIENT INSTRUCTIONS
SURVEY:    You may be receiving a survey from [employer] regarding your visit today. Please complete the survey to enable us to provide the highest quality of care to you and your family. If you cannot score us a very good on any question, please call the office to discuss how we could have made your experience a very good one. Thank you.

## 2023-10-03 NOTE — PROGRESS NOTES
HPI:    Patient is a 44 y.o. female in no acute distress. She is alert and oriented to person, place, and time. History  2013 spontaneous stone passage     2016 CT showed bilateral punctate stones     2/23/2021 New patient for intermittent pain on urination. We have not seen patient since 2016. She states that she had a urine dipstick that was positive where she works and she was treated with a course of Bactrim. After antibiotics were completed the dipstick was still positive. A urine culture was never done. She is complaining of pain after urination. Patient with left flank pain as well. She denies any gross hematuria, frequency, urgency, nocturia or incontinence. She does have regular periods. She states she has daily bowel movements. She denies consumption of known bladder irritants. 3/2022 negative hematuria workup      Today:  Patient is here today with gross hematuria seen earlier in the month. We did order a urine culture which was negative for UTI. Patient was continuing to have urethral pain urgency and dribbling. We were concerned about stone disease. We did order a CT. Patient CT did not show any distinct ureteral stones. She does have bilateral renal calculi with the largest measuring 3 mm on the left. She did have a 4 cm right adnexal cyst which is being followed by OB/GYN. She states that she is not currently having any urinary symptoms. She states that sometimes during her menstrual cycle she feels as though it correlates with her worsening urinary symptoms. She admits to not drinking enough water. She does have daily bowel movements.     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

## 2023-12-12 ENCOUNTER — OFFICE VISIT (OUTPATIENT)
Dept: OBGYN | Age: 43
End: 2023-12-12
Payer: COMMERCIAL

## 2023-12-12 VITALS
DIASTOLIC BLOOD PRESSURE: 80 MMHG | HEIGHT: 66 IN | WEIGHT: 145 LBS | SYSTOLIC BLOOD PRESSURE: 120 MMHG | BODY MASS INDEX: 23.3 KG/M2

## 2023-12-12 DIAGNOSIS — N83.291 COMPLEX CYST OF RIGHT OVARY: ICD-10-CM

## 2023-12-12 DIAGNOSIS — N84.0 UTERINE POLYP: ICD-10-CM

## 2023-12-12 DIAGNOSIS — N93.8 DUB (DYSFUNCTIONAL UTERINE BLEEDING): Primary | ICD-10-CM

## 2023-12-12 DIAGNOSIS — N80.03 ADENOMYOSIS OF THE UTERUS: ICD-10-CM

## 2023-12-12 PROBLEM — M25.562 PAIN IN LEFT KNEE: Status: RESOLVED | Noted: 2023-08-31 | Resolved: 2023-12-12

## 2023-12-12 PROCEDURE — 99213 OFFICE O/P EST LOW 20 MIN: CPT | Performed by: ADVANCED PRACTICE MIDWIFE

## 2023-12-12 RX ORDER — NORGESTIMATE AND ETHINYL ESTRADIOL 0.25-0.035
1 KIT ORAL DAILY
Qty: 1 PACKET | Refills: 3 | Status: SHIPPED | OUTPATIENT
Start: 2023-12-12

## 2023-12-12 NOTE — PROGRESS NOTES
PROBLEM VISIT     Date of service: 2023    Kanika Aleman  Is a 44 y.o. , female    PT's PCP is: CHIKA Dennis CNP     : 1980                                             Subjective:       Patient's last menstrual period was 2023 (exact date). OB History    Para Term  AB Living   7 4 3 1 3 4   SAB IAB Ectopic Molar Multiple Live Births   3         4      # Outcome Date GA Lbr Melchor/2nd Weight Sex Delivery Anes PTL Lv   7 SAB 22           6 SAB 22 3w0d    SAB   ND   5 Term 2009 38w0d 10:00 3.714 kg (8 lb 3 oz) M Vag-Spont None N RIO   4  2007 35w0d 02:00 2.92 kg (6 lb 7 oz) F Vag-Spont None Y RIO      Birth Comments:  labor at 89SMR      Complications:  Labor   3 Term 2002 37w0d 05:00 2.92 kg (6 lb 7 oz) F Vag-Spont None N RIO      Birth Comments: GDM   2 SAB 2001 13w0d    SAB   ND   1 Term 1999 41w0d 07:00 3.345 kg (7 lb 6 oz) F Vag-Spont None N RIO      Birth Comments: pre-e        Social History     Tobacco Use   Smoking Status Former    Packs/day: .25    Types: Cigarettes    Quit date: 2016    Years since quittin.0   Smokeless Tobacco Never        Social History     Substance and Sexual Activity   Alcohol Use Yes    Comment: rarely       Social History     Substance and Sexual Activity   Sexual Activity Yes    Partners: Male    Birth control/protection: Condom    Comment: condoms       Allergies: Cephalexin, Morphine, Pcn [penicillins], Codeine, and Nickel    Chief Complaint   Patient presents with    complex cyst     F/U in house gyn u/s to check for complex cyst of her right ovary. Pt is still having pain with intercourse. And spotting after she had the transvaginal u/s.  Night sweats the past 2 weeks        Last Yearly date:      Last pap date and results: 21    Last HPV date and results: 19 neg     Have you had a positive covid test: Yes    Have you had the covid immunization:

## 2024-01-11 DIAGNOSIS — Z87.19 HISTORY OF DIVERTICULITIS: ICD-10-CM

## 2024-01-11 RX ORDER — ONDANSETRON 4 MG/1
4 TABLET, FILM COATED ORAL EVERY 8 HOURS PRN
Qty: 30 TABLET | Refills: 0 | Status: SHIPPED | OUTPATIENT
Start: 2024-01-11

## 2024-02-13 ENCOUNTER — TELEPHONE (OUTPATIENT)
Dept: UROLOGY | Age: 44
End: 2024-02-13

## 2024-02-13 RX ORDER — KETOROLAC TROMETHAMINE 10 MG/1
10 TABLET, FILM COATED ORAL EVERY 8 HOURS PRN
Qty: 9 TABLET | Refills: 0 | Status: SHIPPED | OUTPATIENT
Start: 2024-02-13 | End: 2025-02-12

## 2024-02-13 RX ORDER — PHENAZOPYRIDINE HYDROCHLORIDE 200 MG/1
200 TABLET, FILM COATED ORAL 3 TIMES DAILY PRN
Qty: 12 TABLET | Refills: 0 | Status: SHIPPED | OUTPATIENT
Start: 2024-02-13 | End: 2024-02-17

## 2024-02-13 NOTE — TELEPHONE ENCOUNTER
Patient called and wanted to know, if possible, for her to get two prescription refills to hold her over until her appointment in the AM. Prescriptions needed include Toradol and Pyridium.     Please advise Thank you    Hi Dr SANTOS!     Per usual-I am having pain with urination and after, not relieved by pyridium (but does help somewhat) since Sunday a.m. Back and front cramps. I'm wondering if it's spasms at this point? Each time we've done xrays or CT, no stones blocking. Urine culture never shows growth. At one point we did PCR urine and meds did work. At this point the pain is causing nausea.   Do you have any thoughts? I do have a KUB due. Would you be able to order Toradol for the pain first along with more pyridium (I have 2 left)? This had helped in the past when we didn't have solutions. All of thus usually dissipates within a week.     Thanks!

## 2024-02-13 NOTE — TELEPHONE ENCOUNTER
Patient called office. 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 The pain has progressively gotten worse throughout the day. Pyridium is not helping her. She has pain throughout lower back,low front abdomen where bladder sits and before and after void. She states before pyridium she did notice darker color to urine in the mornings. She started taking B Complex over a month ago. Recently about a week ago she noticed ammonia odor to urine so she doesn't think its from the B Complex.  At this time she will  the pain medication prescribed (Toradol) and see if this helps. If she chooses go to ER she will go to ER in [place]. Writer advised to increase water not to hesitate to go to ER if she develops fever,nausea,vomiting. Patient voiced understanding.

## 2024-02-13 NOTE — TELEPHONE ENCOUNTER
Please let her know we will send her a few Toradol and a refill on Pyridium.    Please make sure she is drinking upwards of 80 ounces of water a day and avoiding anything with caffeine or alcohol.    We will see her tomorrow and obtain a PCR urine and a vaginal swab    (We did have her scheduled for a cystoscopy to further evaluate her symptoms in October for which patient canceled, but this was rescheduled and patient no-show to an appointment 11/9/2023 for cystoscopy.)

## 2024-02-14 ENCOUNTER — HOSPITAL ENCOUNTER (OUTPATIENT)
Age: 44
Setting detail: SPECIMEN
Discharge: HOME | End: 2024-02-14
Payer: COMMERCIAL

## 2024-02-14 ENCOUNTER — OFFICE VISIT (OUTPATIENT)
Dept: UROLOGY | Age: 44
End: 2024-02-14
Payer: COMMERCIAL

## 2024-02-14 ENCOUNTER — TELEPHONE (OUTPATIENT)
Dept: UROLOGY | Age: 44
End: 2024-02-14

## 2024-02-14 VITALS
WEIGHT: 149 LBS | BODY MASS INDEX: 24.05 KG/M2 | TEMPERATURE: 98.2 F | HEART RATE: 79 BPM | SYSTOLIC BLOOD PRESSURE: 120 MMHG | DIASTOLIC BLOOD PRESSURE: 86 MMHG

## 2024-02-14 DIAGNOSIS — R30.0 DYSURIA: ICD-10-CM

## 2024-02-14 DIAGNOSIS — N23 RENAL COLIC: Primary | ICD-10-CM

## 2024-02-14 DIAGNOSIS — R31.0 GROSS HEMATURIA: ICD-10-CM

## 2024-02-14 LAB
MICROORGANISM/AGENT SPEC: NORMAL
SPECIMEN DESCRIPTION: NORMAL

## 2024-02-14 PROCEDURE — 87210 SMEAR WET MOUNT SALINE/INK: CPT

## 2024-02-14 PROCEDURE — 99215 OFFICE O/P EST HI 40 MIN: CPT | Performed by: PHYSICIAN ASSISTANT

## 2024-02-14 RX ORDER — VITAMIN B COMPLEX
1 CAPSULE ORAL DAILY
COMMUNITY

## 2024-02-14 NOTE — RESULT ENCOUNTER NOTE
Please let her know that her vaginal swab was negative for yeast, bacterial vaginosis and trichomonas

## 2024-02-14 NOTE — TELEPHONE ENCOUNTER
----- Message from Indra Menendez PA-C sent at 2/14/2024 10:03 AM EST -----  Please let her know that her vaginal swab was negative for yeast, bacterial vaginosis and trichomonas

## 2024-02-14 NOTE — PROGRESS NOTES
HPI:    Patient is a 43 y.o. female in no acute distress.  She is alert and oriented to person, place, and time.      History  2013 spontaneous stone passage     2016 CT showed bilateral punctate stones     2/23/2021 New patient for intermittent pain on urination. We have not seen patient since 2016.  She states that she had a urine dipstick that was positive where she works and she was treated with a course of Bactrim.  After antibiotics were completed the dipstick was still positive.  A urine culture was never done.  She is complaining of pain after urination. Patient with left flank pain as well. She denies any gross hematuria, frequency, urgency, nocturia or incontinence.  She does have regular periods.  She states she has daily bowel movements.  She denies consumption of known bladder irritants.    3/2022 negative hematuria workup      Today:  Patient is here today with complaints of pain on urination.  Patient was last seen in October of last year with complaints of gross hematuria.  We did schedule her for a cystoscopy.  She did cancel her appointment and reschedule for November, she no showed for that appointment.  She contacted our office this week with complaints of pain on urination and after she urinates.  This started 2/11/2024.  She was also having cramps in her back and in her front.  She does have a history of kidney stones.  She did use Pyridium with minimal improvement.  She recently started taking B complex.  Patient's last imaging was 9/13/2023.  She did have bilateral renal calculi on that imaging with largest stone measuring 4 mm.  Patient is followed by OB/GYN for a right adnexal cyst and anteverted uterus.        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

## 2024-02-15 ENCOUNTER — TELEPHONE (OUTPATIENT)
Dept: UROLOGY | Age: 44
End: 2024-02-15

## 2024-02-15 DIAGNOSIS — R31.0 GROSS HEMATURIA: ICD-10-CM

## 2024-02-15 DIAGNOSIS — R30.0 DYSURIA: ICD-10-CM

## 2024-02-15 DIAGNOSIS — N23 RENAL COLIC: ICD-10-CM

## 2024-02-15 NOTE — TELEPHONE ENCOUNTER
There was blood seen in the urine on the urinalysis, also positive leukocytes and nitrates, we will await PCR urine for treatment

## 2024-02-15 NOTE — TELEPHONE ENCOUNTER
Patient called office. She asked if we received results for CT and Urine from [facility]. Writer states CT results came in but provider has not had a chance to review these yet. Our office would contact her with results or they would be discussed at next visit. Patient asked if our office could give her a work slip for today because she is still in discomfort even though she was wrote back to go back to work today. Please Advise, Thank You

## 2024-02-15 NOTE — TELEPHONE ENCOUNTER
Please let her know that we did review results from her CAT scan.  There was no obstructing stones, meaning there was no evidence of a stone passing from the kidney down to the bladder.  She does have stones in both kidneys which should not be causing her this type of pain.  The largest stone is 3 mm.  This does not require any intervention at this time.    On the radiology read there was poor visualization of the pancreas with no acute pancreatic abnormality evident.  We do recommend that she follows up with her primary care for further evaluation of this finding.    Otherwise there was no acute intra-abdominal or pelvic abnormality on the CT scan.    Please let her know that her PCR urine is still pending.    Okay to write her off for an additional day today.  Outside of this she would need to contact her primary care for further time off.    Follow-up for cystoscopy next month

## 2024-02-15 NOTE — TELEPHONE ENCOUNTER
Writer called patient advised her of results/response. Patient voiced understanding. Writer had provider sign work letter. Writer faxed this to [phone] per patient's advise.

## 2024-02-16 ENCOUNTER — TELEPHONE (OUTPATIENT)
Dept: UROLOGY | Age: 44
End: 2024-02-16

## 2024-02-16 NOTE — TELEPHONE ENCOUNTER
Called [facility] they do not have the PCR urine finished and the lady said it probably will not be finished today.

## 2024-02-16 NOTE — TELEPHONE ENCOUNTER
----- Message from Indra Menendez PA-C sent at 2/16/2024  8:21 AM EST -----  Please see if her PCR urine is completed

## 2024-02-19 ENCOUNTER — TELEPHONE (OUTPATIENT)
Dept: UROLOGY | Age: 44
End: 2024-02-19

## 2024-02-19 DIAGNOSIS — N23 RENAL COLIC: Primary | ICD-10-CM

## 2024-02-19 DIAGNOSIS — R10.2 PELVIC PAIN: ICD-10-CM

## 2024-02-19 RX ORDER — LEVOFLOXACIN 500 MG/1
500 TABLET, FILM COATED ORAL DAILY
Qty: 10 TABLET | Refills: 0 | Status: SHIPPED | OUTPATIENT
Start: 2024-02-19 | End: 2024-02-29

## 2024-02-19 NOTE — TELEPHONE ENCOUNTER
Please let her know that she did have E. coli on her PCR urine.  We did send in 10 days of Levaquin.    We do agree with ordering a VCUG study to rule out reflux.  We will order this.    We do feel it is also necessary to perform a cystoscopy secondary to her history of gross hematuria..  We will have the VCUG study prior to her cystoscopy and we will discuss the results from the VCUG study at the time of her cystoscopy.    Please set her up with a VCUG study before her upcoming cystoscopy next month

## 2024-02-19 NOTE — TELEPHONE ENCOUNTER
Regarding: Urine  Contact: [phone]  ----- Message from Karen Dickens RN sent at 2/19/2024  7:53 AM EST -----       ----- Message from Jayjay Sands to Indra Menendez PA-C sent at 2/17/2024  7:00 PM -----   I know that we will not receive PCR urine results until Monday, I just wanted to update you.    As far as the discomfort during and after urination, it still has not subsided and it is quite painful, even with the pyridium. It does help me so that it isn't driving me completely crazy, because without it, the pain is not bearable. There is still a pain in the left upper back. I have two pyridium remaining and I took my last Toradol.   Lastly, I have been wondering, could this be a urine reflux issue? The more I look into it, the more it seems the symptoms line up. Would it be more beneficial to schedule testing such as VCUG, instead of doing another cystoscopy (during which time I will feel better and show a normal bladder)?    I do not feel this is a reproductive issue as this discomfort is only with and following urination. I feel as though the last time this occurred (and UA results were abnormal) I was not treated because nothing grew on the culture. Something has to be causing leukocytes, nitrates, protein, blood, RBC, WBS, etc. in the UA- even without growth-being symptomatic with those results-what is the underlying cause?? I drink plenty of water and my morning urine is red in color and smells of ammonia at this point.   These symptoms are getting worse and lasting longer each time. I would like to figure this out so that we know what we can do for a quick solution instead of me having to live like this for a week or longer each time it occurs. I appreciate your feedback.

## 2024-02-19 NOTE — TELEPHONE ENCOUNTER
Patient informed of provider's response and she verbalizes understanding of all information.    Patient would like to schedule her VCUG at [facility].   Order for VCUG faxed to [phone] at [facility].  Patient states she will wait to see if her insurance pays for her appointment, as she has [facility] insurance.    Patient confirmed cystoscopy on 03/29/2024.

## 2024-02-20 ENCOUNTER — TELEPHONE (OUTPATIENT)
Dept: UROLOGY | Age: 44
End: 2024-02-20

## 2024-02-20 NOTE — TELEPHONE ENCOUNTER
See recent telephone encounter, patient is going to have a VCUG study set up at [place].    She will follow-up for cystoscopy and to go over VCUG study

## 2024-02-20 NOTE — TELEPHONE ENCOUNTER
Regarding: Urine  Contact: [phone]  ----- Message from Karen Dickens RN sent at 2/19/2024  7:53 AM EST -----       ----- Message from Jayjay Sands to Indra Menendez PA-C sent at 2/17/2024  7:00 PM -----   I know that we will not receive PCR urine results until Monday, I just wanted to update you.    As far as the discomfort during and after urination, it still has not subsided and it is quite painful, even with the pyridium. It does help me so that it isn't driving me completely crazy, because without it, the pain is not bearable. There is still a pain in the left upper back. I have two pyridium remaining and I took my last Toradol.   Lastly, I have been wondering, could this be a urine reflux issue? The more I look into it, the more it seems the symptoms line up. Would it be more beneficial to schedule testing such as VCUG, instead of doing another cystoscopy (during which time I will feel better and show a normal bladder)?    I do not feel this is a reproductive issue as this discomfort is only with and following urination. I feel as though the last time this occurred (and UA results were abnormal) I was not treated because nothing grew on the culture. Something has to be causing leukocytes, nitrates, protein, blood, RBC, WBS, etc. in the UA- even without growth-being symptomatic with those results-what is the underlying cause?? I drink plenty of water and my morning urine is red in color and smells of ammonia at this point.   These symptoms are getting worse and lasting longer each time. I would like to figure this out so that we know what we can do for a quick solution instead of me having to live like this for a week or longer each time it occurs. I appreciate your feedback.

## 2024-02-27 ENCOUNTER — HOSPITAL ENCOUNTER
Dept: HOSPITAL 101 - NOMS | Age: 44
Discharge: HOME | End: 2024-02-27
Payer: COMMERCIAL

## 2024-02-27 DIAGNOSIS — N80.03: ICD-10-CM

## 2024-02-27 DIAGNOSIS — N93.9: Primary | ICD-10-CM

## 2024-02-27 DIAGNOSIS — R10.2: ICD-10-CM

## 2024-02-27 PROCEDURE — 76830 TRANSVAGINAL US NON-OB: CPT

## 2024-02-27 PROCEDURE — 76856 US EXAM PELVIC COMPLETE: CPT

## 2024-03-01 ENCOUNTER — TELEPHONE (OUTPATIENT)
Dept: UROLOGY | Age: 44
End: 2024-03-01

## 2024-03-01 NOTE — TELEPHONE ENCOUNTER
This test is not something that can be done stat, its not like routine imaging     In addition, dipstick/point-of-care urines are NOT accurate for patients with chronic urinary symptoms, stones, or on birth control [due to gsm]

## 2024-03-01 NOTE — TELEPHONE ENCOUNTER
Patient informed of the provider's response and verbalizes understanding.   Repair Performed By Another Provider Text (Leave Blank If You Do Not Want): After the tissue was excised the defect was repaired by another provider.

## 2024-03-01 NOTE — TELEPHONE ENCOUNTER
Patient called she did a urine dip stick at work, she is still seeing the protein, luekocytes in her urine. Patient is also still having the left sided pain. She has a low grade fever with no cold or flu symptoms, she would like to know if we could possibly change her order from routine to stat .      Please advise

## 2024-03-26 ENCOUNTER — HOSPITAL ENCOUNTER
Age: 44
Discharge: HOME | End: 2024-03-26
Payer: COMMERCIAL

## 2024-03-26 VITALS
SYSTOLIC BLOOD PRESSURE: 123 MMHG | OXYGEN SATURATION: 98 % | RESPIRATION RATE: 16 BRPM | DIASTOLIC BLOOD PRESSURE: 73 MMHG | HEART RATE: 78 BPM | TEMPERATURE: 97.34 F

## 2024-03-26 DIAGNOSIS — R93.89: ICD-10-CM

## 2024-03-26 DIAGNOSIS — Z01.818: Primary | ICD-10-CM

## 2024-03-26 DIAGNOSIS — N84.0: ICD-10-CM

## 2024-03-28 ENCOUNTER — TELEPHONE (OUTPATIENT)
Dept: UROLOGY | Age: 44
End: 2024-03-28

## 2024-03-28 DIAGNOSIS — R31.0 GROSS HEMATURIA: Primary | ICD-10-CM

## 2024-03-28 NOTE — TELEPHONE ENCOUNTER
Writer called to confirm patient's appointment scheduled for 03/29 at 815, unfortunately she informed she had to cancel. The patients insurance does not cover procedures done for [facility]. She stated that if she had to have the Cysto done, she would have to go to someone with [facility] because it would be covered.

## 2024-03-29 NOTE — TELEPHONE ENCOUNTER
I placed a referral for outside urology    She will just need to sign a records release when she decides on a new urologist

## 2024-04-05 ENCOUNTER — HOSPITAL ENCOUNTER (OUTPATIENT)
Age: 44
Discharge: HOME | End: 2024-04-05
Payer: COMMERCIAL

## 2024-04-05 VITALS
HEART RATE: 78 BPM | OXYGEN SATURATION: 97 % | DIASTOLIC BLOOD PRESSURE: 86 MMHG | TEMPERATURE: 97.5 F | SYSTOLIC BLOOD PRESSURE: 137 MMHG

## 2024-04-05 VITALS — OXYGEN SATURATION: 93 % | HEART RATE: 82 BPM | DIASTOLIC BLOOD PRESSURE: 84 MMHG | SYSTOLIC BLOOD PRESSURE: 136 MMHG

## 2024-04-05 VITALS — BODY MASS INDEX: 24.3 KG/M2 | BODY MASS INDEX: 23.8 KG/M2

## 2024-04-05 VITALS — OXYGEN SATURATION: 95 % | HEART RATE: 80 BPM | DIASTOLIC BLOOD PRESSURE: 93 MMHG | SYSTOLIC BLOOD PRESSURE: 133 MMHG

## 2024-04-05 VITALS — OXYGEN SATURATION: 94 % | DIASTOLIC BLOOD PRESSURE: 94 MMHG | SYSTOLIC BLOOD PRESSURE: 132 MMHG | HEART RATE: 85 BPM

## 2024-04-05 VITALS — HEART RATE: 79 BPM | SYSTOLIC BLOOD PRESSURE: 127 MMHG | DIASTOLIC BLOOD PRESSURE: 87 MMHG | OXYGEN SATURATION: 94 %

## 2024-04-05 VITALS — OXYGEN SATURATION: 94 % | SYSTOLIC BLOOD PRESSURE: 118 MMHG | DIASTOLIC BLOOD PRESSURE: 77 MMHG | HEART RATE: 74 BPM

## 2024-04-05 VITALS — HEART RATE: 88 BPM | SYSTOLIC BLOOD PRESSURE: 132 MMHG | DIASTOLIC BLOOD PRESSURE: 99 MMHG | OXYGEN SATURATION: 96 %

## 2024-04-05 VITALS
DIASTOLIC BLOOD PRESSURE: 99 MMHG | SYSTOLIC BLOOD PRESSURE: 132 MMHG | OXYGEN SATURATION: 95 % | HEART RATE: 97 BPM | TEMPERATURE: 98.7 F

## 2024-04-05 VITALS — SYSTOLIC BLOOD PRESSURE: 127 MMHG | DIASTOLIC BLOOD PRESSURE: 87 MMHG | OXYGEN SATURATION: 96 % | HEART RATE: 73 BPM

## 2024-04-05 VITALS — HEART RATE: 93 BPM | OXYGEN SATURATION: 94 % | SYSTOLIC BLOOD PRESSURE: 137 MMHG | DIASTOLIC BLOOD PRESSURE: 89 MMHG

## 2024-04-05 DIAGNOSIS — N84.0: ICD-10-CM

## 2024-04-05 DIAGNOSIS — R93.89: Primary | ICD-10-CM

## 2024-04-05 DIAGNOSIS — K21.9: ICD-10-CM

## 2024-04-05 DIAGNOSIS — F41.9: ICD-10-CM

## 2024-04-05 DIAGNOSIS — K58.0: ICD-10-CM

## 2024-04-05 LAB
ADD MANUAL DIFF: NO
HEMATOCRIT: 34.5 % (ref 36–48)
HEMOGLOBIN: 10.6 G/DL (ref 12–16)
IMMATURE GRANULOCYTES ABS AUTO: 0.05 10^3/UL (ref 0–0.03)
IMMATURE GRANULOCYTES PCT AUTO: 0.6 % (ref 0–0.5)
LYMPHOCYTES  ABSOLUTE AUTO: 3 10^3/UL (ref 1.2–3.8)
MCV RBC: 85.4 FL (ref 81–99)
MEAN CORPUSCULAR HEMOGLOBIN: 26.2 PG (ref 26.7–34)
MEAN CORPUSCULAR HGB CONC: 30.7 G/DL (ref 29.9–35.2)
PLATELET COUNT: 447 10^3/UL (ref 150–450)
RED BLOOD COUNT: 4.04 10^6/UL (ref 4.2–5.4)
WHITE BLOOD COUNT: 8.2 10^3/UL (ref 4–11)

## 2024-04-05 PROCEDURE — 88305 TISSUE EXAM BY PATHOLOGIST: CPT

## 2024-04-05 PROCEDURE — 85025 COMPLETE CBC W/AUTO DIFF WBC: CPT

## 2024-04-05 PROCEDURE — 99999: CPT

## 2024-04-05 PROCEDURE — 36415 COLL VENOUS BLD VENIPUNCTURE: CPT

## 2024-04-05 PROCEDURE — 84702 CHORIONIC GONADOTROPIN TEST: CPT

## 2024-04-05 PROCEDURE — 58558 HYSTEROSCOPY BIOPSY: CPT

## 2024-09-08 NOTE — PATIENT INSTRUCTIONS
SURVEY:    You may be receiving a survey from [employer] regarding your visit today. Please complete the survey to enable us to provide the highest quality of care to you and your family. If you cannot score us a very good on any question, please call the office to discuss how we could have made your experience a very good one. Thank you.
English

## 2024-11-14 NOTE — PATIENT INSTRUCTIONS
SURVEY:    You may be receiving a survey from [employer] regarding your visit today. Please complete the survey to enable us to provide the highest quality of care to you and your family. If you cannot score us a very good on any question, please call the office to discuss how we could have made your experience a very good one. Thank you.
Detail Level: Detailed

## 2025-01-11 ENCOUNTER — APPOINTMENT (OUTPATIENT)
Dept: GENERAL RADIOLOGY | Age: 45
End: 2025-01-11
Payer: COMMERCIAL

## 2025-01-11 ENCOUNTER — HOSPITAL ENCOUNTER (EMERGENCY)
Age: 45
Discharge: HOME | End: 2025-01-11
Attending: EMERGENCY MEDICINE
Payer: COMMERCIAL

## 2025-01-11 VITALS
HEART RATE: 100 BPM | TEMPERATURE: 97.5 F | DIASTOLIC BLOOD PRESSURE: 95 MMHG | OXYGEN SATURATION: 98 % | RESPIRATION RATE: 18 BRPM | SYSTOLIC BLOOD PRESSURE: 123 MMHG

## 2025-01-11 DIAGNOSIS — M70.52 BURSITIS OF LEFT KNEE, UNSPECIFIED BURSA: ICD-10-CM

## 2025-01-11 DIAGNOSIS — M25.562 ACUTE PAIN OF LEFT KNEE: Primary | ICD-10-CM

## 2025-01-11 PROCEDURE — 73562 X-RAY EXAM OF KNEE 3: CPT

## 2025-01-11 PROCEDURE — 99283 EMERGENCY DEPT VISIT LOW MDM: CPT

## 2025-01-11 PROCEDURE — 6370000000 HC RX 637 (ALT 250 FOR IP): Performed by: EMERGENCY MEDICINE

## 2025-01-11 RX ORDER — HYDROCODONE BITARTRATE AND ACETAMINOPHEN 5; 325 MG/1; MG/1
1 TABLET ORAL ONCE
Status: COMPLETED | OUTPATIENT
Start: 2025-01-11 | End: 2025-01-11

## 2025-01-11 RX ORDER — IBUPROFEN 400 MG/1
400 TABLET, FILM COATED ORAL ONCE
Status: COMPLETED | OUTPATIENT
Start: 2025-01-11 | End: 2025-01-11

## 2025-01-11 RX ADMIN — HYDROCODONE BITARTRATE AND ACETAMINOPHEN 1 TABLET: 5; 325 TABLET ORAL at 16:07

## 2025-01-11 RX ADMIN — IBUPROFEN 400 MG: 400 TABLET, FILM COATED ORAL at 16:07

## 2025-01-11 ASSESSMENT — PAIN SCALES - GENERAL
PAINLEVEL_OUTOF10: 7
PAINLEVEL_OUTOF10: 7

## 2025-01-11 NOTE — ED PROVIDER NOTES
[facility] EMERGENCY DEPARTMENT  EMERGENCY DEPARTMENT ENCOUNTER      Pt Name: Jayjay Sands  MRN: [id number]  Birthdate 1980  Date of evaluation: 2025  Provider: Sandee Witt MD    CHIEF COMPLAINT       Chief Complaint   Patient presents with    Knee Pain     Patient slipped on ice today and injured the left knee. Patient states that she felt a pop.          HISTORY OF PRESENT ILLNESS   (Location/Symptom, Timing/Onset, Context/Setting, Quality, Duration, Modifying Factors, Severity)  Note limiting factors.   Jayjay Sands is a 44 y.o. female who presents to the emergency department      43 yo female with left knee pain after slipping on ice.  [place] a pop.  No direct blow.  Pain constant but worse with standing and walking.  No other injuries or other acute concerns.        Nursing Notes were reviewed.    REVIEW OF SYSTEMS    (2-9 systems for level 4, 10 or more for level 5)     Review of Systems   All other systems reviewed and are negative.      Except as noted above the remainder of the review of systems was reviewed and negative.       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    High risk HPV infection     Kidney stone     LSIL (low grade squamous intraepithelial lesion) on Pap smear 2012    LSIL (low grade squamous intraepithelial lesion) on Pap smear 2007    Pre-eclampsia      delivery      labor     PTSD (post-traumatic stress disorder)     Type O blood, Rh negative          SURGICAL HISTORY       Past Surgical History:   Procedure Laterality Date    APPENDECTOMY      BARTHOLIN GLAND CYST EXCISION      COLONOSCOPY  2012    [place]    COLONOSCOPY N/A 2023    -bx(normal)tortuous colon    COLONOSCOPY N/A 2023    COLONOSCOPY POLYPECTOMY SNARE/COLD BIOPSY performed by Fang Lucas MD at [facility] OR    COLPOSCOPY  2012    d/t LSIL     Activity    Alcohol use: Yes     Comment: rarely    Drug use: No    Sexual activity: Yes     Partners: Male     Birth control/protection: Condom     Comment: condoms     Social Determinants of Health     Financial Resource Strain: Low Risk  (3/28/2024)    Overall Financial Resource Strain (CARDIA)     Difficulty of Paying Living Expenses: Not hard at all   Food Insecurity: No Food Insecurity (1/8/2025)    Hunger Vital Sign     Worried About Running Out of Food in the Last Year: Never true     Ran Out of Food in the Last Year: Never true   Transportation Needs: No Transportation Needs (1/8/2025)    PRAPARE - Transportation     Lack of Transportation (Medical): No     Lack of Transportation (Non-Medical): No   Physical Activity: Inactive (3/28/2024)    Exercise Vital Sign     Days of Exercise per Week: 0 days     Minutes of Exercise per Session: 0 min   Stress: No Stress Concern Present (3/28/2024)    [place] [employer] - Occupational Stress Questionnaire     Feeling of Stress : Only a little   Social Connections: Moderately Integrated (3/28/2024)    Social Connection and Isolation Panel [NHANES]     Frequency of Communication with Friends and Family: More than three times a week     Frequency of Social Gatherings with Friends and Family: Twice a week     Attends Denominational Services: 1 to 4 times per year     Active Member of Clubs or Organizations: No     Attends Club or Organization Meetings: Never     Marital Status: Living with partner   Intimate Partner Violence: Not At Risk (3/28/2024)    Humiliation, Afraid, Rape, and Kick questionnaire     Fear of Current or Ex-Partner: No     Emotionally Abused: No     Physically Abused: No     Sexually Abused: No   Housing Stability: Low Risk  (1/8/2025)    Housing Stability Vital Sign     Unable to Pay for Housing in the Last Year: No     Number of Times Moved in the Last Year: 0     Homeless in the Last Year: No       SCREENINGS        Xochilt Coma

## 2025-01-11 NOTE — DISCHARGE INSTRUCTIONS
Tylenol and/or Motrin as needed for pain.  Wear knee brace during the day while standing or walking.  Use crutches to assist with standing or walking ice for 5 to 10-minute intervals as desired for comfort.  Keep elevated to at least seated.  Follow-up with orthopedics as soon as possible, new morning schedule earliest available appointment. You  must seek medical attention immediately for any worsening pain any redness warmth swelling unable to bear weight or any other acute concerns.

## 2025-04-25 ENCOUNTER — HOSPITAL ENCOUNTER (OUTPATIENT)
Age: 45
Discharge: HOME | End: 2025-04-25
Payer: COMMERCIAL

## 2025-04-25 DIAGNOSIS — Z13.220 SCREENING, LIPID: ICD-10-CM

## 2025-04-25 DIAGNOSIS — E55.9 VITAMIN D DEFICIENCY: ICD-10-CM

## 2025-04-25 DIAGNOSIS — D50.9 IRON DEFICIENCY ANEMIA, UNSPECIFIED IRON DEFICIENCY ANEMIA TYPE: ICD-10-CM

## 2025-04-25 DIAGNOSIS — I10 PRIMARY HYPERTENSION: ICD-10-CM

## 2025-04-25 DIAGNOSIS — Z13.220 SCREENING CHOLESTEROL LEVEL: ICD-10-CM

## 2025-04-25 DIAGNOSIS — Z13.1 DIABETES MELLITUS SCREENING: ICD-10-CM

## 2025-04-25 DIAGNOSIS — L74.9 SWEATING ABNORMALITY: ICD-10-CM

## 2025-04-25 DIAGNOSIS — E61.1 LOW IRON: ICD-10-CM

## 2025-04-25 LAB
25(OH)D3 SERPL-MCNC: 39.1 NG/ML (ref 30–100)
ALBUMIN SERPL-MCNC: 4.7 G/DL (ref 3.5–5.2)
ALBUMIN/GLOB SERPL: 1.9 {RATIO} (ref 1–2.5)
ALP SERPL-CCNC: 65 U/L (ref 35–104)
ALT SERPL-CCNC: 7 U/L (ref 10–35)
ANION GAP SERPL CALCULATED.3IONS-SCNC: 11 MMOL/L (ref 9–16)
AST SERPL-CCNC: 18 U/L (ref 10–35)
BASOPHILS # BLD: 0.03 K/UL (ref 0–0.2)
BASOPHILS NFR BLD: 0 % (ref 0–2)
BILIRUB SERPL-MCNC: 1 MG/DL (ref 0–1.2)
BUN SERPL-MCNC: 13 MG/DL (ref 6–20)
BUN/CREAT SERPL: 16 (ref 9–20)
CALCIUM SERPL-MCNC: 9 MG/DL (ref 8.6–10.4)
CHLORIDE SERPL-SCNC: 100 MMOL/L (ref 98–107)
CHOLEST SERPL-MCNC: 225 MG/DL (ref 0–199)
CHOLESTEROL/HDL RATIO: 4.2
CO2 SERPL-SCNC: 24 MMOL/L (ref 20–31)
CREAT SERPL-MCNC: 0.8 MG/DL (ref 0.5–0.9)
EOSINOPHIL # BLD: 0.06 K/UL (ref 0–0.44)
EOSINOPHILS RELATIVE PERCENT: 1 % (ref 1–4)
ERYTHROCYTE [DISTWIDTH] IN BLOOD BY AUTOMATED COUNT: 13.5 % (ref 11.8–14.4)
GFR, ESTIMATED: >90 ML/MIN/1.73M2
GLUCOSE P FAST SERPL-MCNC: 96 MG/DL (ref 74–99)
HCT VFR BLD AUTO: 35.5 % (ref 36.3–47.1)
HDLC SERPL-MCNC: 54 MG/DL
HGB BLD-MCNC: 11.6 G/DL (ref 11.9–15.1)
IMM GRANULOCYTES # BLD AUTO: <0.03 K/UL (ref 0–0.3)
IMM GRANULOCYTES NFR BLD: 0 %
IRON SATN MFR SERPL: 8 % (ref 20–55)
IRON SERPL-MCNC: 36 UG/DL (ref 37–145)
LDLC SERPL CALC-MCNC: 155 MG/DL (ref 0–100)
LYMPHOCYTES NFR BLD: 2.24 K/UL (ref 1.1–3.7)
LYMPHOCYTES RELATIVE PERCENT: 29 % (ref 24–43)
MCH RBC QN AUTO: 27.8 PG (ref 25.2–33.5)
MCHC RBC AUTO-ENTMCNC: 32.7 G/DL (ref 28.4–34.8)
MCV RBC AUTO: 84.9 FL (ref 82.6–102.9)
MONOCYTES NFR BLD: 0.6 K/UL (ref 0.1–1.2)
MONOCYTES NFR BLD: 8 % (ref 3–12)
NEUTROPHILS NFR BLD: 62 % (ref 36–65)
NEUTS SEG NFR BLD: 4.91 K/UL (ref 1.5–8.1)
NRBC BLD-RTO: 0 PER 100 WBC
PLATELET # BLD AUTO: 370 K/UL (ref 138–453)
PMV BLD AUTO: 9.2 FL (ref 8.1–13.5)
POTASSIUM SERPL-SCNC: 3.6 MMOL/L (ref 3.7–5.3)
PROT SERPL-MCNC: 7.3 G/DL (ref 6.6–8.7)
RBC # BLD AUTO: 4.18 M/UL (ref 3.95–5.11)
SODIUM SERPL-SCNC: 135 MMOL/L (ref 136–145)
TIBC SERPL-MCNC: 452 UG/DL (ref 250–450)
TRIGL SERPL-MCNC: 78 MG/DL (ref 0–149)
UNSATURATED IRON BINDING CAPACITY: 416 UG/DL (ref 112–347)
VLDLC SERPL CALC-MCNC: 16 MG/DL (ref 1–30)
WBC OTHER # BLD: 7.9 K/UL (ref 3.5–11.3)

## 2025-04-25 PROCEDURE — 83550 IRON BINDING TEST: CPT

## 2025-04-25 PROCEDURE — 85025 COMPLETE CBC W/AUTO DIFF WBC: CPT

## 2025-04-25 PROCEDURE — 80061 LIPID PANEL: CPT

## 2025-04-25 PROCEDURE — 80053 COMPREHEN METABOLIC PANEL: CPT

## 2025-04-25 PROCEDURE — 83001 ASSAY OF GONADOTROPIN (FSH): CPT

## 2025-04-25 PROCEDURE — 36415 COLL VENOUS BLD VENIPUNCTURE: CPT

## 2025-04-25 PROCEDURE — 83540 ASSAY OF IRON: CPT

## 2025-04-25 PROCEDURE — 82306 VITAMIN D 25 HYDROXY: CPT

## 2025-04-26 LAB — FSH SERPL-ACNC: 1.5 MIU/ML

## 2025-05-14 ENCOUNTER — OFFICE VISIT (OUTPATIENT)
Dept: ONCOLOGY | Age: 45
End: 2025-05-14
Payer: COMMERCIAL

## 2025-05-14 VITALS
BODY MASS INDEX: 21.53 KG/M2 | RESPIRATION RATE: 18 BRPM | TEMPERATURE: 98.3 F | HEIGHT: 66 IN | DIASTOLIC BLOOD PRESSURE: 74 MMHG | SYSTOLIC BLOOD PRESSURE: 129 MMHG | WEIGHT: 134 LBS | HEART RATE: 92 BPM

## 2025-05-14 DIAGNOSIS — R61 NIGHT SWEATS: ICD-10-CM

## 2025-05-14 DIAGNOSIS — D50.9 IRON DEFICIENCY ANEMIA, UNSPECIFIED IRON DEFICIENCY ANEMIA TYPE: Primary | ICD-10-CM

## 2025-05-14 DIAGNOSIS — K90.9 IRON MALABSORPTION: ICD-10-CM

## 2025-05-14 DIAGNOSIS — R63.4 UNEXPLAINED WEIGHT LOSS: ICD-10-CM

## 2025-05-14 PROCEDURE — 99245 OFF/OP CONSLTJ NEW/EST HI 55: CPT | Performed by: INTERNAL MEDICINE

## 2025-05-14 RX ORDER — HEPARIN SODIUM (PORCINE) LOCK FLUSH IV SOLN 100 UNIT/ML 100 UNIT/ML
500 SOLUTION INTRAVENOUS PRN
OUTPATIENT
Start: 2025-05-16

## 2025-05-14 RX ORDER — SODIUM CHLORIDE 9 MG/ML
5-250 INJECTION, SOLUTION INTRAVENOUS PRN
OUTPATIENT
Start: 2025-05-16

## 2025-05-14 RX ORDER — HYDROCORTISONE SODIUM SUCCINATE 100 MG/2ML
100 INJECTION INTRAMUSCULAR; INTRAVENOUS
OUTPATIENT
Start: 2025-05-16

## 2025-05-14 RX ORDER — SODIUM CHLORIDE 9 MG/ML
INJECTION, SOLUTION INTRAVENOUS CONTINUOUS
OUTPATIENT
Start: 2025-05-16

## 2025-05-14 RX ORDER — EPINEPHRINE 1 MG/ML
0.3 INJECTION, SOLUTION, CONCENTRATE INTRAVENOUS PRN
OUTPATIENT
Start: 2025-05-16

## 2025-05-14 RX ORDER — DIPHENHYDRAMINE HYDROCHLORIDE 50 MG/ML
50 INJECTION, SOLUTION INTRAMUSCULAR; INTRAVENOUS
OUTPATIENT
Start: 2025-05-16

## 2025-05-14 RX ORDER — ACETAMINOPHEN 325 MG/1
650 TABLET ORAL ONCE
OUTPATIENT
Start: 2025-05-16 | End: 2025-05-16

## 2025-05-14 RX ORDER — ALBUTEROL SULFATE 90 UG/1
4 INHALANT RESPIRATORY (INHALATION) PRN
OUTPATIENT
Start: 2025-05-16

## 2025-05-14 RX ORDER — MEPERIDINE HYDROCHLORIDE 50 MG/ML
12.5 INJECTION INTRAMUSCULAR; INTRAVENOUS; SUBCUTANEOUS PRN
OUTPATIENT
Start: 2025-05-16

## 2025-05-14 RX ORDER — ONDANSETRON 2 MG/ML
8 INJECTION INTRAMUSCULAR; INTRAVENOUS
OUTPATIENT
Start: 2025-05-16

## 2025-05-14 RX ORDER — ACETAMINOPHEN 325 MG/1
650 TABLET ORAL
OUTPATIENT
Start: 2025-05-16

## 2025-05-14 RX ORDER — FAMOTIDINE 10 MG/ML
20 INJECTION, SOLUTION INTRAVENOUS
OUTPATIENT
Start: 2025-05-16

## 2025-05-14 RX ORDER — SODIUM CHLORIDE 0.9 % (FLUSH) 0.9 %
5-40 SYRINGE (ML) INJECTION PRN
OUTPATIENT
Start: 2025-05-16

## 2025-05-14 NOTE — PROGRESS NOTES
DIAGNOSIS:   Iron deficiency anemia likely secondary to menorrhagia  .  No response to oral iron  Unexplained night sweats and weight loss, concerning for lymphoma  CURRENT THERAPY:  Plan IV iron  Workup in progress to exclude lymphoma  BRIEF CASE HISTORY:   Jayjay Sands is a very pleasant 44 y.o. female who is referred to us for refractory iron deficiency anemia.  As well as night sweats and weight loss    History of Present Illness  The patient presents for evaluation of anemia.    She began experiencing persistent fatigue approximately a year ago, which led her to seek medical attention from [facility]. Laboratory tests were conducted, and she was prescribed a daily regimen of oral  iron, supplemented with vitamin C. Despite this treatment, her symptoms persisted, prompting her to discontinue the medication recently. She reports no exposure to chemicals or radiation and has not noticed any lumps, bumps, or enlarged lymph nodes. She experiences shortness of breath when ascending stairs and has been unable to maintain her gym routine for the past month due to a leg fracture sustained in January 2025. She was non-weightbearing for 90 days following the injury. She also reports a craving for ice, which has resulted in dental damage.    Approximately 8 weeks ago, she began experiencing new symptoms, including night sweats, which have been severe enough to disrupt her sleep. Hormonal imbalances have been identified in the past, and she has undergone bioidentical hormone therapy to manage menstrual bleeding. A fibroid was removed last year, which significantly reduced her bleeding. Since discontinuing the hormone therapy, her menstrual cycles have been regular, although with heavy bleeding.    She has experienced unintentional weight loss of about 20 pounds since August or September 2024, despite not actively trying to lose weight. She has recently regained a few pounds. She recalls severe abdominal pain in the fall of

## 2025-05-14 NOTE — PATIENT INSTRUCTIONS
Need Iv iron , see orders, to be given once approved   Need labs and CT scan prior to RV   Rv in 6 weeks or so

## 2025-05-22 ENCOUNTER — HOSPITAL ENCOUNTER (OUTPATIENT)
Dept: CT IMAGING | Age: 45
Discharge: HOME | End: 2025-05-24
Attending: INTERNAL MEDICINE
Payer: COMMERCIAL

## 2025-05-22 ENCOUNTER — HOSPITAL ENCOUNTER (OUTPATIENT)
Age: 45
Discharge: HOME | End: 2025-05-22
Attending: INTERNAL MEDICINE
Payer: COMMERCIAL

## 2025-05-22 DIAGNOSIS — K90.9 IRON MALABSORPTION: ICD-10-CM

## 2025-05-22 DIAGNOSIS — R63.4 UNEXPLAINED WEIGHT LOSS: ICD-10-CM

## 2025-05-22 DIAGNOSIS — R61 NIGHT SWEATS: ICD-10-CM

## 2025-05-22 DIAGNOSIS — D50.9 IRON DEFICIENCY ANEMIA, UNSPECIFIED IRON DEFICIENCY ANEMIA TYPE: ICD-10-CM

## 2025-05-22 PROCEDURE — 6360000004 HC RX CONTRAST MEDICATION: Performed by: INTERNAL MEDICINE

## 2025-05-22 PROCEDURE — 74177 CT ABD & PELVIS W/CONTRAST: CPT

## 2025-05-22 RX ORDER — IOPAMIDOL 755 MG/ML
75 INJECTION, SOLUTION INTRAVASCULAR
Status: COMPLETED | OUTPATIENT
Start: 2025-05-22 | End: 2025-05-22

## 2025-05-22 RX ORDER — IOPAMIDOL 755 MG/ML
18 INJECTION, SOLUTION INTRAVASCULAR
Status: COMPLETED | OUTPATIENT
Start: 2025-05-22 | End: 2025-05-22

## 2025-05-22 RX ADMIN — IOPAMIDOL 18 ML: 755 INJECTION, SOLUTION INTRAVENOUS at 13:29

## 2025-05-22 RX ADMIN — IOPAMIDOL 75 ML: 755 INJECTION, SOLUTION INTRAVENOUS at 13:29

## 2025-05-29 ENCOUNTER — HOSPITAL ENCOUNTER (OUTPATIENT)
Dept: INFUSION THERAPY | Age: 45
Discharge: HOME | End: 2025-05-29
Payer: COMMERCIAL

## 2025-05-29 VITALS
HEART RATE: 84 BPM | DIASTOLIC BLOOD PRESSURE: 70 MMHG | TEMPERATURE: 98.1 F | SYSTOLIC BLOOD PRESSURE: 117 MMHG | RESPIRATION RATE: 18 BRPM

## 2025-05-29 DIAGNOSIS — K90.9 IRON MALABSORPTION: Primary | ICD-10-CM

## 2025-05-29 DIAGNOSIS — D50.9 IRON DEFICIENCY ANEMIA, UNSPECIFIED IRON DEFICIENCY ANEMIA TYPE: ICD-10-CM

## 2025-05-29 PROCEDURE — 2500000003 HC RX 250 WO HCPCS: Performed by: INTERNAL MEDICINE

## 2025-05-29 PROCEDURE — 96365 THER/PROPH/DIAG IV INF INIT: CPT

## 2025-05-29 PROCEDURE — 96366 THER/PROPH/DIAG IV INF ADDON: CPT

## 2025-05-29 PROCEDURE — 2580000003 HC RX 258: Performed by: INTERNAL MEDICINE

## 2025-05-29 PROCEDURE — 96375 TX/PRO/DX INJ NEW DRUG ADDON: CPT

## 2025-05-29 PROCEDURE — 6360000002 HC RX W HCPCS: Performed by: INTERNAL MEDICINE

## 2025-05-29 PROCEDURE — 6370000000 HC RX 637 (ALT 250 FOR IP): Performed by: INTERNAL MEDICINE

## 2025-05-29 RX ORDER — SODIUM CHLORIDE 9 MG/ML
5-250 INJECTION, SOLUTION INTRAVENOUS PRN
Status: DISCONTINUED | OUTPATIENT
Start: 2025-05-29 | End: 2025-05-30 | Stop reason: HOSPADM

## 2025-05-29 RX ORDER — ALBUTEROL SULFATE 90 UG/1
4 INHALANT RESPIRATORY (INHALATION) PRN
OUTPATIENT
Start: 2025-05-29

## 2025-05-29 RX ORDER — EPINEPHRINE 1 MG/ML
0.3 INJECTION, SOLUTION, CONCENTRATE INTRAVENOUS PRN
OUTPATIENT
Start: 2025-05-29

## 2025-05-29 RX ORDER — DEXAMETHASONE SODIUM PHOSPHATE 10 MG/ML
10 INJECTION, SOLUTION INTRAMUSCULAR; INTRAVENOUS ONCE
Status: COMPLETED | OUTPATIENT
Start: 2025-05-29 | End: 2025-05-29

## 2025-05-29 RX ORDER — DIPHENHYDRAMINE HYDROCHLORIDE 50 MG/ML
50 INJECTION, SOLUTION INTRAMUSCULAR; INTRAVENOUS
OUTPATIENT
Start: 2025-05-29

## 2025-05-29 RX ORDER — ACETAMINOPHEN 325 MG/1
650 TABLET ORAL
OUTPATIENT
Start: 2025-05-29

## 2025-05-29 RX ORDER — FAMOTIDINE 10 MG/ML
20 INJECTION, SOLUTION INTRAVENOUS
OUTPATIENT
Start: 2025-05-29

## 2025-05-29 RX ORDER — SODIUM CHLORIDE 0.9 % (FLUSH) 0.9 %
5-40 SYRINGE (ML) INJECTION PRN
Status: DISCONTINUED | OUTPATIENT
Start: 2025-05-29 | End: 2025-05-30 | Stop reason: HOSPADM

## 2025-05-29 RX ORDER — MEPERIDINE HYDROCHLORIDE 25 MG/ML
12.5 INJECTION INTRAMUSCULAR; INTRAVENOUS; SUBCUTANEOUS PRN
OUTPATIENT
Start: 2025-05-29

## 2025-05-29 RX ORDER — HYDROCORTISONE SODIUM SUCCINATE 100 MG/2ML
100 INJECTION INTRAMUSCULAR; INTRAVENOUS
OUTPATIENT
Start: 2025-05-29

## 2025-05-29 RX ORDER — SODIUM CHLORIDE 0.9 % (FLUSH) 0.9 %
5-40 SYRINGE (ML) INJECTION PRN
OUTPATIENT
Start: 2025-05-29

## 2025-05-29 RX ORDER — ONDANSETRON 2 MG/ML
8 INJECTION INTRAMUSCULAR; INTRAVENOUS
OUTPATIENT
Start: 2025-05-29

## 2025-05-29 RX ORDER — ACETAMINOPHEN 325 MG/1
650 TABLET ORAL ONCE
Status: COMPLETED | OUTPATIENT
Start: 2025-05-29 | End: 2025-05-29

## 2025-05-29 RX ORDER — SODIUM CHLORIDE 9 MG/ML
INJECTION, SOLUTION INTRAVENOUS CONTINUOUS
OUTPATIENT
Start: 2025-05-29

## 2025-05-29 RX ORDER — ACETAMINOPHEN 325 MG/1
650 TABLET ORAL ONCE
Status: CANCELLED | OUTPATIENT
Start: 2025-05-29 | End: 2025-05-29

## 2025-05-29 RX ORDER — SODIUM CHLORIDE 9 MG/ML
5-250 INJECTION, SOLUTION INTRAVENOUS PRN
OUTPATIENT
Start: 2025-05-29

## 2025-05-29 RX ORDER — SODIUM CHLORIDE 9 MG/ML
5-250 INJECTION, SOLUTION INTRAVENOUS PRN
Status: CANCELLED | OUTPATIENT
Start: 2025-05-29

## 2025-05-29 RX ORDER — HEPARIN 100 UNIT/ML
500 SYRINGE INTRAVENOUS PRN
OUTPATIENT
Start: 2025-05-29

## 2025-05-29 RX ORDER — DEXAMETHASONE SODIUM PHOSPHATE 10 MG/ML
10 INJECTION, SOLUTION INTRAMUSCULAR; INTRAVENOUS ONCE
Status: CANCELLED | OUTPATIENT
Start: 2025-05-29 | End: 2025-05-29

## 2025-05-29 RX ADMIN — SODIUM CHLORIDE 25 MG: 9 INJECTION, SOLUTION INTRAVENOUS at 09:28

## 2025-05-29 RX ADMIN — SODIUM CHLORIDE 975 MG: 0.9 INJECTION, SOLUTION INTRAVENOUS at 10:40

## 2025-05-29 RX ADMIN — DEXAMETHASONE SODIUM PHOSPHATE 10 MG: 10 INJECTION, SOLUTION INTRAMUSCULAR; INTRAVENOUS at 09:53

## 2025-05-29 RX ADMIN — SODIUM CHLORIDE, PRESERVATIVE FREE 10 ML: 5 INJECTION INTRAVENOUS at 08:20

## 2025-05-29 RX ADMIN — SODIUM CHLORIDE 100 ML/HR: 0.9 INJECTION, SOLUTION INTRAVENOUS at 08:25

## 2025-05-29 RX ADMIN — ACETAMINOPHEN 650 MG: 325 TABLET ORAL at 09:52

## 2025-05-29 ASSESSMENT — PAIN SCALES - GENERAL: PAINLEVEL_OUTOF10: 0

## 2025-05-30 ENCOUNTER — TELEPHONE (OUTPATIENT)
Dept: ONCOLOGY | Age: 45
End: 2025-05-30

## 2025-05-30 NOTE — TELEPHONE ENCOUNTER
Jayjay calls this morning stating had Infed infusion yesterday.  Wakes up today with eyes slightly burning and red face and chest.  She states this is the only issue.  She asks if can take some Benadryl; instructed yes and that if any signs of worsening reaction of hives or throat/breathing issue to get to ED.  She verbalizes understanding and will let us know if further issues arise.    Forward note to Dr. Elam should further instruction to patient be needed.

## 2025-06-27 ENCOUNTER — HOSPITAL ENCOUNTER (OUTPATIENT)
Age: 45
Discharge: HOME | End: 2025-06-27
Payer: COMMERCIAL

## 2025-06-27 DIAGNOSIS — R61 NIGHT SWEATS: ICD-10-CM

## 2025-06-27 DIAGNOSIS — K90.9 IRON MALABSORPTION: ICD-10-CM

## 2025-06-27 DIAGNOSIS — R63.4 UNEXPLAINED WEIGHT LOSS: ICD-10-CM

## 2025-06-27 DIAGNOSIS — D50.9 IRON DEFICIENCY ANEMIA, UNSPECIFIED IRON DEFICIENCY ANEMIA TYPE: ICD-10-CM

## 2025-06-27 LAB
ALBUMIN SERPL-MCNC: 4.9 G/DL (ref 3.5–5.2)
ALBUMIN/GLOB SERPL: 1.9 {RATIO} (ref 1–2.5)
ALP SERPL-CCNC: 59 U/L (ref 35–104)
ALT SERPL-CCNC: 8 U/L (ref 10–35)
ANION GAP SERPL CALCULATED.3IONS-SCNC: 13 MMOL/L (ref 9–16)
AST SERPL-CCNC: 16 U/L (ref 10–35)
BASOPHILS # BLD: 0.04 K/UL (ref 0–0.2)
BASOPHILS NFR BLD: 1 % (ref 0–2)
BILIRUB SERPL-MCNC: 0.6 MG/DL (ref 0–1.2)
BUN SERPL-MCNC: 10 MG/DL (ref 6–20)
BUN/CREAT SERPL: 14 (ref 9–20)
CALCIUM SERPL-MCNC: 9.3 MG/DL (ref 8.6–10.4)
CHLORIDE SERPL-SCNC: 101 MMOL/L (ref 98–107)
CO2 SERPL-SCNC: 24 MMOL/L (ref 20–31)
CREAT SERPL-MCNC: 0.7 MG/DL (ref 0.5–0.9)
EOSINOPHIL # BLD: 0.08 K/UL (ref 0–0.44)
EOSINOPHILS RELATIVE PERCENT: 1 % (ref 1–4)
ERYTHROCYTE [DISTWIDTH] IN BLOOD BY AUTOMATED COUNT: 15.7 % (ref 11.8–14.4)
FERRITIN SERPL-MCNC: 127 NG/ML (ref 15–150)
GFR, ESTIMATED: >90 ML/MIN/1.73M2
GLUCOSE SERPL-MCNC: 85 MG/DL (ref 74–99)
HCT VFR BLD AUTO: 41.8 % (ref 36.3–47.1)
HGB BLD-MCNC: 13.6 G/DL (ref 11.9–15.1)
IMM GRANULOCYTES # BLD AUTO: 0.04 K/UL (ref 0–0.3)
IMM GRANULOCYTES NFR BLD: 1 %
IRON SATN MFR SERPL: 17 % (ref 20–55)
IRON SERPL-MCNC: 58 UG/DL (ref 37–145)
LDH SERPL-CCNC: 131 U/L (ref 135–214)
LYMPHOCYTES NFR BLD: 2.24 K/UL (ref 1.1–3.7)
LYMPHOCYTES RELATIVE PERCENT: 28 % (ref 24–43)
MCH RBC QN AUTO: 28.8 PG (ref 25.2–33.5)
MCHC RBC AUTO-ENTMCNC: 32.5 G/DL (ref 28.4–34.8)
MCV RBC AUTO: 88.6 FL (ref 82.6–102.9)
MONOCYTES NFR BLD: 0.48 K/UL (ref 0.1–1.2)
MONOCYTES NFR BLD: 6 % (ref 3–12)
NEUTROPHILS NFR BLD: 63 % (ref 36–65)
NEUTS SEG NFR BLD: 5.02 K/UL (ref 1.5–8.1)
NRBC BLD-RTO: 0 PER 100 WBC
PLATELET # BLD AUTO: 276 K/UL (ref 138–453)
PMV BLD AUTO: 9.9 FL (ref 8.1–13.5)
POTASSIUM SERPL-SCNC: 3.8 MMOL/L (ref 3.7–5.3)
PROT SERPL-MCNC: 7.4 G/DL (ref 6.6–8.7)
RBC # BLD AUTO: 4.72 M/UL (ref 3.95–5.11)
SODIUM SERPL-SCNC: 138 MMOL/L (ref 136–145)
TIBC SERPL-MCNC: 336 UG/DL (ref 250–450)
UNSATURATED IRON BINDING CAPACITY: 278 UG/DL (ref 112–347)
WBC OTHER # BLD: 7.9 K/UL (ref 3.5–11.3)

## 2025-06-27 PROCEDURE — 36415 COLL VENOUS BLD VENIPUNCTURE: CPT

## 2025-06-27 PROCEDURE — 80053 COMPREHEN METABOLIC PANEL: CPT

## 2025-06-27 PROCEDURE — 85025 COMPLETE CBC W/AUTO DIFF WBC: CPT

## 2025-06-27 PROCEDURE — 83615 LACTATE (LD) (LDH) ENZYME: CPT

## 2025-06-27 PROCEDURE — 83550 IRON BINDING TEST: CPT

## 2025-06-27 PROCEDURE — 83540 ASSAY OF IRON: CPT

## 2025-06-27 PROCEDURE — 82728 ASSAY OF FERRITIN: CPT

## 2025-07-02 ENCOUNTER — OFFICE VISIT (OUTPATIENT)
Dept: ONCOLOGY | Age: 45
End: 2025-07-02
Payer: COMMERCIAL

## 2025-07-02 VITALS
RESPIRATION RATE: 18 BRPM | SYSTOLIC BLOOD PRESSURE: 129 MMHG | BODY MASS INDEX: 21.53 KG/M2 | DIASTOLIC BLOOD PRESSURE: 83 MMHG | WEIGHT: 134 LBS | HEART RATE: 71 BPM | TEMPERATURE: 97.7 F | HEIGHT: 66 IN

## 2025-07-02 DIAGNOSIS — N92.0 MENORRHAGIA WITH REGULAR CYCLE: ICD-10-CM

## 2025-07-02 DIAGNOSIS — D50.9 IRON DEFICIENCY ANEMIA, UNSPECIFIED IRON DEFICIENCY ANEMIA TYPE: Primary | ICD-10-CM

## 2025-07-02 DIAGNOSIS — R61 NIGHT SWEATS: ICD-10-CM

## 2025-07-02 DIAGNOSIS — K90.9 IRON MALABSORPTION: ICD-10-CM

## 2025-07-02 PROCEDURE — 99214 OFFICE O/P EST MOD 30 MIN: CPT | Performed by: INTERNAL MEDICINE

## 2025-07-02 NOTE — PROGRESS NOTES
Neuro exam: intact cranial nerves bilaterally no motor or sensory deficit, gait is normal. Lymphatic: no adenopathy appreciated in the supraclavicular, axillary, cervical or inguinal area    REVIEW OF LABORATORY DATA:   Lab Results   Component Value Date    WBC 7.9 06/27/2025    HGB 13.6 06/27/2025    HCT 41.8 06/27/2025    MCV 88.6 06/27/2025     06/27/2025     Lab Results   Component Value Date    IRON 58 06/27/2025    TIBC 336 06/27/2025    FERRITIN 127 06/27/2025       REVIEW OF RADIOLOGICAL RESULTS:   CT scan   IMPRESSION:  1. No findings to explain patient's symptoms or anemia.     IMPRESSION:     PLAN:   1. Iron deficiency anemia.  Improved significantly with IV iron.  The iron deficiency anemia is secondary to menorrhagia and blood loss.  We will monitor her closely.  Will continue to work with GYN to try to control her menorrhagia.    2. Night sweats.  Will Suggest that her night sweats are related to perimenopausal syndrome.    3. Weight loss.  Weight has stabilized and her symptoms improved significantly.  I think a lot of that might be related to her perimenopausal syndrome.  4.  Menorrhagia secondary to uterine fibroid.  Continues to struggle with menorrhagia and she plans to follow-up with GYN.  Options include hormonal manipulation, Mirena IUD, GnRH antagonist versus ablation or even hysterectomy were discussed in details.    5.  Hypertension, started on hydrochlorothiazide, pressure is much better controlled we will continue to monitor closely.    Follow-up  Will continue to monitor her labs every 3 months and we will see her back in a year, sooner if there is any need      Estela Elam MD  Hematologist/Medical Oncologist  [facility]    I spent a total of 60 minutes on the date of the service which included preparing to see the patient, face-to-face patient care, completing clinical documentation, obtaining and/or reviewing separately obtained history, performing

## (undated) DEVICE — TUBING SUCT NON-STRL 9/32X100 W/CNNT

## (undated) DEVICE — CANNULA ORAL NSL AD CO2 N INTUB O2 DEL DISP TRU LNK

## (undated) DEVICE — SOLUTION IV IRRIG POUR BRL 0.9% SODIUM CHL 2F7124

## (undated) DEVICE — SINGLE-USE BIOPSY FORCEPS: Brand: RADIAL JAW 4